# Patient Record
Sex: FEMALE | NOT HISPANIC OR LATINO | Employment: FULL TIME | URBAN - METROPOLITAN AREA
[De-identification: names, ages, dates, MRNs, and addresses within clinical notes are randomized per-mention and may not be internally consistent; named-entity substitution may affect disease eponyms.]

---

## 2022-04-26 ENCOUNTER — OFFICE VISIT (OUTPATIENT)
Dept: FAMILY MEDICINE CLINIC | Facility: CLINIC | Age: 42
End: 2022-04-26

## 2022-04-26 VITALS
DIASTOLIC BLOOD PRESSURE: 62 MMHG | BODY MASS INDEX: 21.14 KG/M2 | HEIGHT: 61 IN | HEART RATE: 88 BPM | SYSTOLIC BLOOD PRESSURE: 118 MMHG | TEMPERATURE: 97.7 F | OXYGEN SATURATION: 99 % | WEIGHT: 112 LBS

## 2022-04-26 DIAGNOSIS — Z01.419 WELL WOMAN EXAM WITH ROUTINE GYNECOLOGICAL EXAM: Primary | ICD-10-CM

## 2022-04-26 DIAGNOSIS — Z11.59 NEED FOR HEPATITIS C SCREENING TEST: ICD-10-CM

## 2022-04-26 DIAGNOSIS — Z11.3 SCREEN FOR STD (SEXUALLY TRANSMITTED DISEASE): ICD-10-CM

## 2022-04-26 DIAGNOSIS — Z00.00 WELLNESS EXAMINATION: ICD-10-CM

## 2022-04-26 PROBLEM — B97.7 HUMAN PAPILLOMA VIRUS INFECTION: Status: ACTIVE | Noted: 2020-03-06

## 2022-04-26 PROBLEM — G93.0 CHOROID PLEXUS CYST: Status: ACTIVE | Noted: 2020-05-01

## 2022-04-26 PROBLEM — B97.7 HUMAN PAPILLOMA VIRUS INFECTION: Status: RESOLVED | Noted: 2020-03-06 | Resolved: 2022-04-26

## 2022-04-26 PROBLEM — IMO0002 FETAL GROWTH RESTRICTION: Status: ACTIVE | Noted: 2020-09-14

## 2022-04-26 PROBLEM — IMO0002 FETAL GROWTH RESTRICTION: Status: RESOLVED | Noted: 2020-09-14 | Resolved: 2022-04-26

## 2022-04-26 LAB
ALBUMIN SERPL-MCNC: 4.5 G/DL (ref 3.5–5.2)
ALBUMIN/GLOB SERPL: 1.4 G/DL
ALP SERPL-CCNC: 49 U/L (ref 39–117)
ALT SERPL W P-5'-P-CCNC: 13 U/L (ref 1–33)
ANION GAP SERPL CALCULATED.3IONS-SCNC: 12.9 MMOL/L (ref 5–15)
AST SERPL-CCNC: 16 U/L (ref 1–32)
BASOPHILS # BLD AUTO: 0.05 10*3/MM3 (ref 0–0.2)
BASOPHILS NFR BLD AUTO: 1.1 % (ref 0–1.5)
BILIRUB BLD-MCNC: NEGATIVE MG/DL
BILIRUB SERPL-MCNC: 0.5 MG/DL (ref 0–1.2)
BUN SERPL-MCNC: 11 MG/DL (ref 6–20)
BUN/CREAT SERPL: 16.4 (ref 7–25)
CALCIUM SPEC-SCNC: 9.3 MG/DL (ref 8.6–10.5)
CANDIDA SPECIES: NEGATIVE
CHLORIDE SERPL-SCNC: 103 MMOL/L (ref 98–107)
CHOLEST SERPL-MCNC: 254 MG/DL (ref 0–200)
CLARITY, POC: CLEAR
CO2 SERPL-SCNC: 22.1 MMOL/L (ref 22–29)
COLOR UR: ABNORMAL
CREAT SERPL-MCNC: 0.67 MG/DL (ref 0.57–1)
DEPRECATED RDW RBC AUTO: 43.9 FL (ref 37–54)
EGFRCR SERPLBLD CKD-EPI 2021: 114.2 ML/MIN/1.73
EOSINOPHIL # BLD AUTO: 0.05 10*3/MM3 (ref 0–0.4)
EOSINOPHIL NFR BLD AUTO: 1.1 % (ref 0.3–6.2)
ERYTHROCYTE [DISTWIDTH] IN BLOOD BY AUTOMATED COUNT: 16.4 % (ref 12.3–15.4)
GARDNERELLA VAGINALIS: NEGATIVE
GLOBULIN UR ELPH-MCNC: 3.2 GM/DL
GLUCOSE SERPL-MCNC: 88 MG/DL (ref 65–99)
GLUCOSE UR STRIP-MCNC: NEGATIVE MG/DL
HAV IGM SERPL QL IA: NORMAL
HBV CORE IGM SERPL QL IA: NORMAL
HBV SURFACE AG SERPL QL IA: NORMAL
HCT VFR BLD AUTO: 37.7 % (ref 34–46.6)
HCV AB SER DONR QL: NORMAL
HDLC SERPL-MCNC: 88 MG/DL (ref 40–60)
HGB BLD-MCNC: 11.3 G/DL (ref 12–15.9)
HIV1+2 AB SER QL: NORMAL
IMM GRANULOCYTES # BLD AUTO: 0.01 10*3/MM3 (ref 0–0.05)
IMM GRANULOCYTES NFR BLD AUTO: 0.2 % (ref 0–0.5)
KETONES UR QL: ABNORMAL
LDLC SERPL CALC-MCNC: 150 MG/DL (ref 0–100)
LDLC/HDLC SERPL: 1.67 {RATIO}
LEUKOCYTE EST, POC: NEGATIVE
LYMPHOCYTES # BLD AUTO: 1.24 10*3/MM3 (ref 0.7–3.1)
LYMPHOCYTES NFR BLD AUTO: 27.6 % (ref 19.6–45.3)
MCH RBC QN AUTO: 22.8 PG (ref 26.6–33)
MCHC RBC AUTO-ENTMCNC: 30 G/DL (ref 31.5–35.7)
MCV RBC AUTO: 76 FL (ref 79–97)
MONOCYTES # BLD AUTO: 0.32 10*3/MM3 (ref 0.1–0.9)
MONOCYTES NFR BLD AUTO: 7.1 % (ref 5–12)
NEUTROPHILS NFR BLD AUTO: 2.83 10*3/MM3 (ref 1.7–7)
NEUTROPHILS NFR BLD AUTO: 62.9 % (ref 42.7–76)
NITRITE UR-MCNC: NEGATIVE MG/ML
NRBC BLD AUTO-RTO: 0 /100 WBC (ref 0–0.2)
PH UR: 5.5 [PH] (ref 5–8)
PLATELET # BLD AUTO: 313 10*3/MM3 (ref 140–450)
PMV BLD AUTO: 10.1 FL (ref 6–12)
POTASSIUM SERPL-SCNC: 4.2 MMOL/L (ref 3.5–5.2)
PROT SERPL-MCNC: 7.7 G/DL (ref 6–8.5)
PROT UR STRIP-MCNC: NEGATIVE MG/DL
RBC # BLD AUTO: 4.96 10*6/MM3 (ref 3.77–5.28)
RBC # UR STRIP: NEGATIVE /UL
SODIUM SERPL-SCNC: 138 MMOL/L (ref 136–145)
SP GR UR: 1.02 (ref 1–1.03)
T VAGINALIS DNA VAG QL PROBE+SIG AMP: NEGATIVE
TRIGL SERPL-MCNC: 95 MG/DL (ref 0–150)
TSH SERPL DL<=0.05 MIU/L-ACNC: 0.98 UIU/ML (ref 0.27–4.2)
UROBILINOGEN UR QL: NORMAL
VLDLC SERPL-MCNC: 16 MG/DL (ref 5–40)
WBC NRBC COR # BLD: 4.5 10*3/MM3 (ref 3.4–10.8)

## 2022-04-26 PROCEDURE — 85025 COMPLETE CBC W/AUTO DIFF WBC: CPT | Performed by: NURSE PRACTITIONER

## 2022-04-26 PROCEDURE — 80053 COMPREHEN METABOLIC PANEL: CPT | Performed by: NURSE PRACTITIONER

## 2022-04-26 PROCEDURE — 99385 PREV VISIT NEW AGE 18-39: CPT | Performed by: NURSE PRACTITIONER

## 2022-04-26 PROCEDURE — 36415 COLL VENOUS BLD VENIPUNCTURE: CPT | Performed by: NURSE PRACTITIONER

## 2022-04-26 PROCEDURE — G0432 EIA HIV-1/HIV-2 SCREEN: HCPCS | Performed by: NURSE PRACTITIONER

## 2022-04-26 PROCEDURE — 86695 HERPES SIMPLEX TYPE 1 TEST: CPT | Performed by: NURSE PRACTITIONER

## 2022-04-26 PROCEDURE — 86696 HERPES SIMPLEX TYPE 2 TEST: CPT | Performed by: NURSE PRACTITIONER

## 2022-04-26 PROCEDURE — 87661 TRICHOMONAS VAGINALIS AMPLIF: CPT | Performed by: NURSE PRACTITIONER

## 2022-04-26 PROCEDURE — 87591 N.GONORRHOEAE DNA AMP PROB: CPT | Performed by: NURSE PRACTITIONER

## 2022-04-26 PROCEDURE — 87660 TRICHOMONAS VAGIN DIR PROBE: CPT | Performed by: NURSE PRACTITIONER

## 2022-04-26 PROCEDURE — 87510 GARDNER VAG DNA DIR PROBE: CPT | Performed by: NURSE PRACTITIONER

## 2022-04-26 PROCEDURE — 80061 LIPID PANEL: CPT | Performed by: NURSE PRACTITIONER

## 2022-04-26 PROCEDURE — 84443 ASSAY THYROID STIM HORMONE: CPT | Performed by: NURSE PRACTITIONER

## 2022-04-26 PROCEDURE — 86592 SYPHILIS TEST NON-TREP QUAL: CPT | Performed by: NURSE PRACTITIONER

## 2022-04-26 PROCEDURE — 87480 CANDIDA DNA DIR PROBE: CPT | Performed by: NURSE PRACTITIONER

## 2022-04-26 PROCEDURE — 80074 ACUTE HEPATITIS PANEL: CPT | Performed by: NURSE PRACTITIONER

## 2022-04-26 PROCEDURE — 87491 CHLMYD TRACH DNA AMP PROBE: CPT | Performed by: NURSE PRACTITIONER

## 2022-04-26 PROCEDURE — 81002 URINALYSIS NONAUTO W/O SCOPE: CPT | Performed by: NURSE PRACTITIONER

## 2022-04-26 RX ORDER — PROGESTERONE 200 MG/1
CAPSULE ORAL
COMMUNITY
End: 2022-04-26

## 2022-04-26 RX ORDER — METRONIDAZOLE 500 MG/1
TABLET ORAL
COMMUNITY
End: 2022-04-26

## 2022-04-26 RX ORDER — IBUPROFEN 800 MG/1
TABLET ORAL
COMMUNITY
End: 2022-04-26

## 2022-04-26 NOTE — PROGRESS NOTES
Venipuncture Blood Specimen Collection  Venipuncture performed in LEFT ARM  by Marj Mckenzie with good hemostasis. Patient tolerated the procedure well without complications.   04/26/22   Marj Mckenzie

## 2022-04-26 NOTE — PROGRESS NOTES
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     Social History     Tobacco Use   • Smoking status: Former Smoker   • Smokeless tobacco: Never Used   Vaping Use   • Vaping Use: Every day   • Substances: Nicotine   • Devices: RefZenpriseble tank   Substance Use Topics   • Alcohol use: Not Currently   • Drug use: Defer      Current Outpatient Medications on File Prior to Visit   Medication Sig   • [DISCONTINUED] ibuprofen (ADVIL,MOTRIN) 800 MG tablet ibuprofen 800 mg tablet   • [DISCONTINUED] metroNIDAZOLE (FLAGYL) 500 MG tablet metronidazole 500 mg tablet   • [DISCONTINUED] Progesterone (PROMETRIUM) 200 MG capsule progesterone micronized 200 mg capsule   place one capsule in vagina each night before bedtime     No current facility-administered medications on file prior to visit.      No Known Allergies   Health Maintenance Due   Topic Date Due   • ANNUAL PHYSICAL  Never done   • COVID-19 Vaccine (1) Never done   • HEPATITIS C SCREENING  Never done   • PAP SMEAR  Never done      Eugene Boles presents to [facility]  Here as a new patient for an annual wellness with pap smear.     Last pap smear in Nov of 2020. She has 5 living children, last 3 pregnancy were premature deliveries (36 weeks, 35 weeks, 34 weeks, 33 weeks, 34 weeks). She was on progesterone for her last pregnancy due to low levels.  Patient states she has a history of an abnormal Pap about 10 years ago where she had a biopsy that was benign.    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    Patient denies breast lumps tenderness or swelling.  Discussed with patient that she will be due for mammogram upon return from deployment.      Objective   Vital Signs:   /62 (BP Location: Left arm, Patient Position: Sitting, Cuff Size:  "Adult)   Pulse 88   Temp 97.7 °F (36.5 °C) (Temporal)   Ht 153.7 cm (60.5\")   Wt 50.8 kg (112 lb)   SpO2 99%   BMI 21.51 kg/m²     Review of Systems   Physical Exam  Vitals reviewed.   Constitutional:       General: She is not in acute distress.     Appearance: Normal appearance. She is well-developed.   HENT:      Head: Normocephalic and atraumatic.      Right Ear: External ear normal.      Left Ear: External ear normal.   Eyes:      Conjunctiva/sclera: Conjunctivae normal.      Pupils: Pupils are equal, round, and reactive to light.   Cardiovascular:      Rate and Rhythm: Normal rate and regular rhythm.      Heart sounds: Normal heart sounds.   Pulmonary:      Effort: Pulmonary effort is normal.      Breath sounds: Normal breath sounds.   Genitourinary:     General: Normal vulva.      Vagina: Vaginal discharge present.      Uterus: Not tender.       Adnexa:         Right: No tenderness.          Left: No tenderness.              Comments: Light brown discharge noted in vaginal vault.  Musculoskeletal:      Cervical back: Neck supple.      Right lower leg: No edema.      Left lower leg: No edema.   Skin:     General: Skin is warm and dry.   Neurological:      Mental Status: She is alert and oriented to person, place, and time.      Cranial Nerves: No cranial nerve deficit.   Psychiatric:         Mood and Affect: Mood and affect normal.         Behavior: Behavior normal.         Thought Content: Thought content normal.         Judgment: Judgment normal.        Result Review :                 Assessment and Plan    Diagnoses and all orders for this visit:    1. Well woman exam with routine gynecological exam (Primary)  -     IgP, Aptima HPV    2. Wellness examination  -     POCT urinalysis dipstick, manual  -     CBC & Differential  -     Comprehensive Metabolic Panel  -     Lipid Panel  -     TSH Rfx On Abnormal To Free T4    3. Need for hepatitis C screening test    4. Screen for STD (sexually transmitted " disease)  -     RPR  -     Hepatitis Panel, Acute  -     HIV-1 / O / 2 Ag / Antibody 4th Generation  -     HSV 1 & 2 - Specific Antibody, IgG  -     Chlamydia trachomatis, Neisseria gonorrhoeae, Trichomonas vaginalis, PCR - Swab, Vagina  -     Gardnerella vaginalis, Trichomonas vaginalis, Candida albicans, DNA - Swab, Vagina        Follow Up   Return in about 1 year (around 4/26/2023) for Annual physical.  Patient was given instructions and counseling regarding her condition or for health maintenance advice. Please see specific information pulled into the AVS if appropriate.

## 2022-04-27 LAB
HSV1 IGG SER IA-ACNC: 28.9 INDEX (ref 0–0.9)
HSV2 IGG SER IA-ACNC: <0.91 INDEX (ref 0–0.9)
RPR SER QL: NORMAL

## 2022-04-28 LAB
C TRACH RRNA SPEC QL NAA+PROBE: NEGATIVE
N GONORRHOEA RRNA SPEC QL NAA+PROBE: NEGATIVE
T VAGINALIS RRNA SPEC QL NAA+PROBE: NEGATIVE

## 2024-01-24 ENCOUNTER — OFFICE VISIT (OUTPATIENT)
Dept: INTERNAL MEDICINE | Facility: CLINIC | Age: 44
End: 2024-01-24
Payer: COMMERCIAL

## 2024-01-24 VITALS
HEIGHT: 61 IN | TEMPERATURE: 97 F | HEART RATE: 73 BPM | WEIGHT: 119 LBS | DIASTOLIC BLOOD PRESSURE: 84 MMHG | OXYGEN SATURATION: 99 % | BODY MASS INDEX: 22.47 KG/M2 | SYSTOLIC BLOOD PRESSURE: 130 MMHG

## 2024-01-24 DIAGNOSIS — R09.81 CHRONIC NASAL CONGESTION: ICD-10-CM

## 2024-01-24 DIAGNOSIS — Z11.3 SCREEN FOR STD (SEXUALLY TRANSMITTED DISEASE): ICD-10-CM

## 2024-01-24 DIAGNOSIS — Z13.29 THYROID DISORDER SCREEN: ICD-10-CM

## 2024-01-24 DIAGNOSIS — Z30.8 ENCOUNTER FOR TUBAL LIGATION COUNSELING: ICD-10-CM

## 2024-01-24 DIAGNOSIS — Z76.89 ESTABLISHING CARE WITH NEW DOCTOR, ENCOUNTER FOR: Primary | ICD-10-CM

## 2024-01-24 DIAGNOSIS — Z00.00 ANNUAL PHYSICAL EXAM: ICD-10-CM

## 2024-01-24 DIAGNOSIS — Z13.220 LIPID SCREENING: ICD-10-CM

## 2024-01-24 DIAGNOSIS — H61.23 BILATERAL IMPACTED CERUMEN: ICD-10-CM

## 2024-01-24 NOTE — PROGRESS NOTES
"Chief Complaint  Establish Care and Annual Exam    Subjective        Eugene Boles presents to [facility] for establishment of care, annual checkup, acute concerns.    Patient reports overall she is in a good state of health, she does not currently take any medications.  She has no her medical conditions she is aware of.  No known allergies.  She had a visit a couple of years ago, had a well woman and annual physical exam, unfortunately her Pap smear results never got completed, so unknown.  She did have STD screening done, which was negative, lab work was completed, which did show elevated cholesterol, unclear if she was fasting or not.  She does have acute concern regarding chronic congestion, feels difficult to breathe through both nasal passages.  She does not have runny nose, itchy eyes, watery eyes, sneezing.  She is also concerned regarding her menstrual cycles, she had some difficulties over the last year, they did do a D&C in were considering hysterectomy, but [place] was [place] at the time, so they deferred.  She was on oral hormonal contraceptives until August, and her cycles have returned to normal.  She is a mother of 5, and she is desiring tubal for permanent birth control measures.  She would like a referral to gynecology to have that completed.  She would like to have additional STD screening completed.    Objective   Vital Signs:   /84 (BP Location: Left arm, Patient Position: Sitting, Cuff Size: Adult)   Pulse 73   Temp 97 °F (36.1 °C) (Temporal)   Ht 153.7 cm (60.51\")   Wt 54 kg (119 lb)   SpO2 99%   BMI 22.85 kg/m²     Physical Exam  Vitals and nursing note reviewed.   Constitutional:       Appearance: Normal appearance. She is normal weight.   HENT:      Head: Normocephalic and atraumatic.      Right Ear: External ear normal. There is impacted cerumen.      Left Ear: External ear normal. There is impacted cerumen.      Nose: Congestion present.      " Mouth/Throat:      Mouth: Mucous membranes are moist.      Pharynx: Oropharynx is clear.   Eyes:      Conjunctiva/sclera: Conjunctivae normal.      Pupils: Pupils are equal, round, and reactive to light.   Cardiovascular:      Rate and Rhythm: Normal rate and regular rhythm.   Pulmonary:      Effort: Pulmonary effort is normal.      Breath sounds: Normal breath sounds.   Neurological:      Mental Status: She is alert.   Psychiatric:         Mood and Affect: Mood normal.         Thought Content: Thought content normal.        Result Review :  {The following data was reviewed by PER Bolden on 01/24/24       Ear Cerumen Removal    Date/Time: 1/24/2024 4:32 PM    Performed by: Laz Peter APRN  Authorized by: Laz Peter APRN    Anesthesia:  Local Anesthetic: none  Location details: left ear and right ear  Patient tolerance: patient tolerated the procedure well with no immediate complications  Comments: Copious amounts of cerumen removed bilaterally.  Patient tolerated well.  Improved hearing post procedure.  Procedure type: instrumentation, irrigation   Sedation:  Patient sedated: no                Diagnoses and all orders for this visit:    1. Establishing care with new doctor, encounter for (Primary)  -     HIV-1/O/2 ANTIGEN/ANTIBODY, 4TH GENERATION  -     HSV 1 and 2-Specific Ab, IgG  -     RPR  -     Chlamydia trachomatis, Neisseria gonorrhoeae, PCR - , Urine, Random Void    2. Annual physical exam  Assessment & Plan:  Screening labs reviewed/ordered  Counseling provided regarding age appropriate screenings and immunizations, healthy diet and exercise.       Orders:  -     Comprehensive Metabolic Panel  -     CBC & Differential  -     TSH  -     Lipid Panel  -     Fluzone >6 Months ([id number])    3. Lipid screening  -     Lipid Panel    4. Thyroid disorder screen  -     TSH    5. Screen for STD (sexually transmitted disease)    6. Chronic nasal congestion  -     Ambulatory Referral to ENT  (Otolaryngology)    7. Encounter for tubal ligation counseling  -     Ambulatory Referral to Obstetrics / Gynecology    8. Bilateral impacted cerumen    Other orders  -     Ear Cerumen Removal    Patient will schedule a follow-up for nurse visit for lab work, not fasting today.  We will follow-up with her with those results when they are available.  Cerumen disimpacted today, hearing improved.  Referral to ENT for chronic congestion.  Referral to gynecology for discussion regarding tubal and need for any other types of contraceptives.  Would recommend follow-up here annually, sooner if needed.      Follow Up   No follow-ups on file.  Patient was given instructions and counseling regarding her condition or for health maintenance advice. Please see specific information pulled into the AVS if appropriate.     Laz Peter, APRN  1/24/2024  This note was electronically signed.

## 2024-01-25 ENCOUNTER — CLINICAL SUPPORT (OUTPATIENT)
Dept: INTERNAL MEDICINE | Facility: CLINIC | Age: 44
End: 2024-01-25
Payer: COMMERCIAL

## 2024-01-25 ENCOUNTER — TELEPHONE (OUTPATIENT)
Dept: OBSTETRICS AND GYNECOLOGY | Facility: CLINIC | Age: 44
End: 2024-01-25
Payer: COMMERCIAL

## 2024-01-25 DIAGNOSIS — Z01.89 ENCOUNTER FOR LABORATORY TEST: Primary | ICD-10-CM

## 2024-01-25 LAB
ALBUMIN SERPL-MCNC: 4.5 G/DL (ref 3.5–5.2)
ALBUMIN/GLOB SERPL: 1.5 G/DL
ALP SERPL-CCNC: 98 U/L (ref 39–117)
ALT SERPL W P-5'-P-CCNC: 164 U/L (ref 1–33)
ANION GAP SERPL CALCULATED.3IONS-SCNC: 11.8 MMOL/L (ref 5–15)
AST SERPL-CCNC: 73 U/L (ref 1–32)
BASOPHILS # BLD AUTO: 0.02 10*3/MM3 (ref 0–0.2)
BASOPHILS NFR BLD AUTO: 0.6 % (ref 0–1.5)
BILIRUB SERPL-MCNC: 0.8 MG/DL (ref 0–1.2)
BUN SERPL-MCNC: 10 MG/DL (ref 6–20)
BUN/CREAT SERPL: 13 (ref 7–25)
C TRACH RRNA CVX QL NAA+PROBE: NOT DETECTED
CALCIUM SPEC-SCNC: 9.4 MG/DL (ref 8.6–10.5)
CHLORIDE SERPL-SCNC: 104 MMOL/L (ref 98–107)
CHOLEST SERPL-MCNC: 222 MG/DL (ref 0–200)
CO2 SERPL-SCNC: 25.2 MMOL/L (ref 22–29)
CREAT SERPL-MCNC: 0.77 MG/DL (ref 0.57–1)
DEPRECATED RDW RBC AUTO: 38.4 FL (ref 37–54)
EGFRCR SERPLBLD CKD-EPI 2021: 99.5 ML/MIN/1.73
EOSINOPHIL # BLD AUTO: 0.05 10*3/MM3 (ref 0–0.4)
EOSINOPHIL NFR BLD AUTO: 1.4 % (ref 0.3–6.2)
ERYTHROCYTE [DISTWIDTH] IN BLOOD BY AUTOMATED COUNT: 11.4 % (ref 12.3–15.4)
GLOBULIN UR ELPH-MCNC: 3 GM/DL
GLUCOSE SERPL-MCNC: 90 MG/DL (ref 65–99)
HCT VFR BLD AUTO: 43.9 % (ref 34–46.6)
HDLC SERPL-MCNC: 75 MG/DL (ref 40–60)
HGB BLD-MCNC: 14.5 G/DL (ref 12–15.9)
HIV 1+2 AB+HIV1 P24 AG SERPL QL IA: NORMAL
IMM GRANULOCYTES # BLD AUTO: 0 10*3/MM3 (ref 0–0.05)
IMM GRANULOCYTES NFR BLD AUTO: 0 % (ref 0–0.5)
LDLC SERPL CALC-MCNC: 104 MG/DL (ref 0–100)
LDLC/HDLC SERPL: 1.28 {RATIO}
LYMPHOCYTES # BLD AUTO: 1.18 10*3/MM3 (ref 0.7–3.1)
LYMPHOCYTES NFR BLD AUTO: 32.6 % (ref 19.6–45.3)
MCH RBC QN AUTO: 30.5 PG (ref 26.6–33)
MCHC RBC AUTO-ENTMCNC: 33 G/DL (ref 31.5–35.7)
MCV RBC AUTO: 92.4 FL (ref 79–97)
MONOCYTES # BLD AUTO: 0.28 10*3/MM3 (ref 0.1–0.9)
MONOCYTES NFR BLD AUTO: 7.7 % (ref 5–12)
N GONORRHOEA RRNA SPEC QL NAA+PROBE: NOT DETECTED
NEUTROPHILS NFR BLD AUTO: 2.09 10*3/MM3 (ref 1.7–7)
NEUTROPHILS NFR BLD AUTO: 57.7 % (ref 42.7–76)
NRBC BLD AUTO-RTO: 0 /100 WBC (ref 0–0.2)
PLATELET # BLD AUTO: 209 10*3/MM3 (ref 140–450)
PMV BLD AUTO: 10.3 FL (ref 6–12)
POTASSIUM SERPL-SCNC: 3.8 MMOL/L (ref 3.5–5.2)
PROT SERPL-MCNC: 7.5 G/DL (ref 6–8.5)
RBC # BLD AUTO: 4.75 10*6/MM3 (ref 3.77–5.28)
RPR SER QL: NORMAL
SODIUM SERPL-SCNC: 141 MMOL/L (ref 136–145)
TRIGL SERPL-MCNC: 254 MG/DL (ref 0–150)
TSH SERPL DL<=0.05 MIU/L-ACNC: 0.93 UIU/ML (ref 0.27–4.2)
VLDLC SERPL-MCNC: 43 MG/DL (ref 5–40)
WBC NRBC COR # BLD AUTO: 3.62 10*3/MM3 (ref 3.4–10.8)

## 2024-01-25 PROCEDURE — 86696 HERPES SIMPLEX TYPE 2 TEST: CPT | Performed by: NURSE PRACTITIONER

## 2024-01-25 PROCEDURE — 85025 COMPLETE CBC W/AUTO DIFF WBC: CPT | Performed by: NURSE PRACTITIONER

## 2024-01-25 PROCEDURE — 80061 LIPID PANEL: CPT | Performed by: NURSE PRACTITIONER

## 2024-01-25 PROCEDURE — 86695 HERPES SIMPLEX TYPE 1 TEST: CPT | Performed by: NURSE PRACTITIONER

## 2024-01-25 PROCEDURE — 36415 COLL VENOUS BLD VENIPUNCTURE: CPT | Performed by: NURSE PRACTITIONER

## 2024-01-25 PROCEDURE — 84443 ASSAY THYROID STIM HORMONE: CPT | Performed by: NURSE PRACTITIONER

## 2024-01-25 PROCEDURE — 87591 N.GONORRHOEAE DNA AMP PROB: CPT | Performed by: NURSE PRACTITIONER

## 2024-01-25 PROCEDURE — 80053 COMPREHEN METABOLIC PANEL: CPT | Performed by: NURSE PRACTITIONER

## 2024-01-25 PROCEDURE — G0432 EIA HIV-1/HIV-2 SCREEN: HCPCS | Performed by: NURSE PRACTITIONER

## 2024-01-25 PROCEDURE — 86592 SYPHILIS TEST NON-TREP QUAL: CPT | Performed by: NURSE PRACTITIONER

## 2024-01-25 PROCEDURE — 87491 CHLMYD TRACH DNA AMP PROBE: CPT | Performed by: NURSE PRACTITIONER

## 2024-01-25 NOTE — PROGRESS NOTES
Venipuncture Blood Specimen Collection  Venipuncture performed in [facility] by Thea Ritter RN with good hemostasis. Patient tolerated the procedure well without complications. Urine, random void left for testing as well.  01/25/24   Thea Ritter RN

## 2024-01-26 LAB
HSV1 IGG SER IA-ACNC: 23.4 INDEX (ref 0–0.9)
HSV2 IGG SER IA-ACNC: <0.91 INDEX (ref 0–0.9)

## 2024-01-31 ENCOUNTER — OFFICE VISIT (OUTPATIENT)
Dept: OBSTETRICS AND GYNECOLOGY | Facility: CLINIC | Age: 44
End: 2024-01-31
Payer: COMMERCIAL

## 2024-01-31 VITALS
HEIGHT: 61 IN | BODY MASS INDEX: 22.09 KG/M2 | HEART RATE: 83 BPM | WEIGHT: 117 LBS | SYSTOLIC BLOOD PRESSURE: 132 MMHG | DIASTOLIC BLOOD PRESSURE: 79 MMHG

## 2024-01-31 DIAGNOSIS — Z01.419 WOMEN'S ANNUAL ROUTINE GYNECOLOGICAL EXAMINATION: Primary | ICD-10-CM

## 2024-01-31 DIAGNOSIS — Z30.09 UNWANTED FERTILITY: ICD-10-CM

## 2024-01-31 DIAGNOSIS — N89.8 VAGINAL DISCHARGE: ICD-10-CM

## 2024-01-31 PROBLEM — Z98.891 HISTORY OF CESAREAN SECTION: Status: RESOLVED | Noted: 2024-01-31 | Resolved: 2024-01-31

## 2024-01-31 PROBLEM — H61.23 BILATERAL IMPACTED CERUMEN: Status: RESOLVED | Noted: 2024-01-24 | Resolved: 2024-01-31

## 2024-01-31 PROBLEM — Z00.00 ANNUAL PHYSICAL EXAM: Status: RESOLVED | Noted: 2024-01-24 | Resolved: 2024-01-31

## 2024-01-31 LAB
CANDIDA SPECIES: NEGATIVE
GARDNERELLA VAGINALIS: NEGATIVE
T VAGINALIS DNA VAG QL PROBE+SIG AMP: NEGATIVE

## 2024-01-31 PROCEDURE — 87480 CANDIDA DNA DIR PROBE: CPT | Performed by: OBSTETRICS & GYNECOLOGY

## 2024-01-31 PROCEDURE — 87510 GARDNER VAG DNA DIR PROBE: CPT | Performed by: OBSTETRICS & GYNECOLOGY

## 2024-01-31 PROCEDURE — 99386 PREV VISIT NEW AGE 40-64: CPT | Performed by: OBSTETRICS & GYNECOLOGY

## 2024-01-31 PROCEDURE — 87660 TRICHOMONAS VAGIN DIR PROBE: CPT | Performed by: OBSTETRICS & GYNECOLOGY

## 2024-01-31 RX ORDER — SODIUM CHLORIDE 0.9 % (FLUSH) 0.9 %
3 SYRINGE (ML) INJECTION EVERY 12 HOURS SCHEDULED
OUTPATIENT
Start: 2024-01-31

## 2024-01-31 RX ORDER — SODIUM CHLORIDE 9 MG/ML
40 INJECTION, SOLUTION INTRAVENOUS AS NEEDED
OUTPATIENT
Start: 2024-01-31

## 2024-01-31 RX ORDER — SODIUM CHLORIDE, SODIUM LACTATE, POTASSIUM CHLORIDE, CALCIUM CHLORIDE 600; 310; 30; 20 MG/100ML; MG/100ML; MG/100ML; MG/100ML
125 INJECTION, SOLUTION INTRAVENOUS CONTINUOUS
OUTPATIENT
Start: 2024-01-31

## 2024-01-31 RX ORDER — ONDANSETRON 2 MG/ML
4 INJECTION INTRAMUSCULAR; INTRAVENOUS EVERY 6 HOURS PRN
OUTPATIENT
Start: 2024-01-31

## 2024-01-31 RX ORDER — SODIUM CHLORIDE 0.9 % (FLUSH) 0.9 %
10 SYRINGE (ML) INJECTION AS NEEDED
OUTPATIENT
Start: 2024-01-31

## 2024-01-31 NOTE — PROGRESS NOTES
"Well Woman Visit    CC: BOB     HPI:   43 y.o. who presents for a well woman exam.  Her menstrual cycles are monthly, lasting approximately 5 days.  She does report some increased vaginal discharge.  She also is interested in proceeding with permanent sterilization.  She states she has no desire for any future childbearing.  She is not interested in other contraceptive methods.    History: PMHx, Meds, Allergies, PSHx, Social Hx, and POBHx all reviewed and updated.    /79   Pulse 83   Ht 153.7 cm (60.51\")   Wt 53.1 kg (117 lb)   LMP 2024   Breastfeeding No   BMI 22.47 kg/m²     Physical Exam  Vitals and nursing note reviewed. Exam conducted with a chaperone present.   Constitutional:       General: She is not in acute distress.     Appearance: Normal appearance. She is not ill-appearing.   HENT:      Head: Normocephalic and atraumatic.      Mouth/Throat:      Mouth: Mucous membranes are moist.      Pharynx: Oropharynx is clear.   Eyes:      Extraocular Movements: Extraocular movements intact.   Neck:      Thyroid: No thyroid mass or thyromegaly.   Chest:   Breasts:     Breasts are symmetrical.      Right: Normal. No swelling, bleeding, inverted nipple, mass, nipple discharge, skin change or tenderness.      Left: Normal. No swelling, bleeding, inverted nipple, mass, nipple discharge, skin change or tenderness.   Abdominal:      General: There is no distension.      Palpations: Abdomen is soft. There is no mass.      Tenderness: There is no abdominal tenderness.      Hernia: There is no hernia in the left inguinal area or right inguinal area.   Genitourinary:     General: Normal vulva.      Exam position: Lithotomy position.      Pubic Area: No rash.       Labia:         Right: No rash, tenderness, lesion or injury.         Left: No rash, tenderness, lesion or injury.       Urethra: No prolapse, urethral pain or urethral lesion.      Vagina: Normal. No vaginal discharge, erythema, tenderness, " bleeding or prolapsed vaginal walls.      Cervix: Normal. No cervical motion tenderness, friability, lesion, erythema or cervical bleeding.      Uterus: Normal. Not enlarged and not tender.       Adnexa:         Right: No mass or tenderness.          Left: No mass or tenderness.     Lymphadenopathy:      Upper Body:      Right upper body: No supraclavicular or axillary adenopathy.      Left upper body: No supraclavicular or axillary adenopathy.   Skin:     General: Skin is warm and dry.   Neurological:      Mental Status: She is alert and oriented to person, place, and time.   Psychiatric:         Mood and Affect: Mood normal.         Behavior: Behavior normal.         Thought Content: Thought content normal.         ASSESSMENT AND PLAN:    Diagnoses and all orders for this visit:    1. Women's annual routine gynecological examination (Primary)  Assessment & Plan:  Pap  Mammogram  Recommend daily multivitamin with folic acid    Orders:  -     IGP,rfx Aptima HPV All Pth  -     Mammo Screening Digital Tomosynthesis Bilateral With CAD; Future    2. Vaginal discharge  -     Gardnerella vaginalis, Trichomonas vaginalis, Candida albicans, DNA - Swab, Cervix    3. Unwanted fertility  Assessment & Plan:  We have discussed the risks, benefits, and alternatives to the procedure.  We discussed the risks including the risk of infection, bleeding and hemorrhage, injury to nearby structure including, bowel, bladder, pelvic vasculature, pelvic nerves, ovaries, and the uterus, and other nearby structures.  We have discussed the risks of regret, risk of failure, and if failure occurred and increased risk for ectopic pregnancy.  We discussed the risk of menstrual changes, hormonal changes, and risk of pelvic pain post sterilization.  The patient has been offered alternative forms of birth control including: Oral contraceptives, NuvaRing, birth control patches, progesterone only birth control pills, Depo-Provera, all intrauterine  contraceptives, partner vasectomy.  The patient expresses her understanding and wished to proceed with female permanent sterilization.  We have discussed the different methods of female sterilization including hysteroscopic and laparoscopic techniques.  With the laparoscopic techniques we have discussed occlusion of the tube with Filshie clips, Hulka clips, and Falope-Rings.  We have discussed cauterization of the fallopian tube, partial salpingectomy, and complete salpingectomy.  Patient desires complete salpingectomy.  She understands this completely irreversible.  The patient declines other forms of contraception until her surgery.  I have advised that she should remain abstinent or use condoms for contraception until surgery time.  She should absolutely be abstinent for at least 2 weeks prior to surgery.    Orders:  -     Case Request; Standing  -     sodium chloride 0.9 % flush 3 mL  -     sodium chloride 0.9 % flush 10 mL  -     sodium chloride 0.9 % infusion 40 mL  -     lactated ringers infusion  -     ondansetron (ZOFRAN) injection 4 mg  -     ceFAZolin (ANCEF) 2,000 mg in sodium chloride 0.9 % 100 mL IVPB  -     Case Request    Other orders  -     Follow Anesthesia Guidelines / Protocol; Future  -     Follow Anesthesia Guidelines / Protocol; Standing  -     Verify / Perform Chlorhexidine Skin Prep; Standing  -     Verify / Perform Chlorhexidine Skin Prep if Indicated (If Not Already Completed); Standing  -     Obtain Informed Consent; Future  -     Provide NPO Instructions to Patient; Future  -     Chlorhexidine Skin Prep; Future  -     Notify Physician - Standard; Standing  -     Type & Screen; Standing  -     Insert Peripheral IV; Standing  -     Saline Lock & Maintain IV Access; Standing  -     CBC & Differential; Standing  -     hCG, Quantitative, Pregnancy; Standing        Counseling:     All BC Options R/B/A/SE/E of each reviewed  Track menses, RTO IF <q21d, >7d long, or  heavy    Preventative:   MMG  s/p FLU vaccine this season  s/p COVID vaccine  COVID booster recommended    She understands the importance of having any ordered tests to be performed in a timely fashion.  The risks of not performing them include, but are not limited to, advanced cancer stages, bone loss from osteoporosis and/or subsequent increase in morbidity and/or mortality.  She is encouraged to review her results online and/or contact or office if she has questions.     Follow Up:  Return for After surgery.    Óscar Saha MD  01/31/2024

## 2024-02-01 ENCOUNTER — PATIENT ROUNDING (BHMG ONLY) (OUTPATIENT)
Dept: OBSTETRICS AND GYNECOLOGY | Facility: CLINIC | Age: 44
End: 2024-02-01
Payer: COMMERCIAL

## 2024-02-01 PROBLEM — G93.0 CHOROID PLEXUS CYST: Status: RESOLVED | Noted: 2020-05-01 | Resolved: 2024-02-01

## 2024-02-01 NOTE — ASSESSMENT & PLAN NOTE
We have discussed the risks, benefits, and alternatives to the procedure.  We discussed the risks including the risk of infection, bleeding and hemorrhage, injury to nearby structure including, bowel, bladder, pelvic vasculature, pelvic nerves, ovaries, and the uterus, and other nearby structures.  We have discussed the risks of regret, risk of failure, and if failure occurred and increased risk for ectopic pregnancy.  We discussed the risk of menstrual changes, hormonal changes, and risk of pelvic pain post sterilization.  The patient has been offered alternative forms of birth control including: Oral contraceptives, NuvaRing, birth control patches, progesterone only birth control pills, Depo-Provera, all intrauterine contraceptives, partner vasectomy.  The patient expresses her understanding and wished to proceed with female permanent sterilization.  We have discussed the different methods of female sterilization including hysteroscopic and laparoscopic techniques.  With the laparoscopic techniques we have discussed occlusion of the tube with Filshie clips, Hulka clips, and Falope-Rings.  We have discussed cauterization of the fallopian tube, partial salpingectomy, and complete salpingectomy.  Patient desires complete salpingectomy.  She understands this completely irreversible.  The patient declines other forms of contraception until her surgery.  I have advised that she should remain abstinent or use condoms for contraception until surgery time.  She should absolutely be abstinent for at least 2 weeks prior to surgery.

## 2024-02-04 LAB
CONV .: NORMAL
CYTOLOGIST CVX/VAG CYTO: NORMAL
CYTOLOGY CVX/VAG DOC CYTO: NORMAL
CYTOLOGY CVX/VAG DOC THIN PREP: NORMAL
DX ICD CODE: NORMAL
HIV 1 & 2 AB SER-IMP: NORMAL
OTHER STN SPEC: NORMAL
STAT OF ADQ CVX/VAG CYTO-IMP: NORMAL

## 2024-03-19 NOTE — PRE-PROCEDURE INSTRUCTIONS
IMPORTANT INSTRUCTIONS - PRE-ADMISSION TESTING  DO NOT EAT OR CHEW anything after midnight the night before your procedure.    You may have CLEAR liquids up to 2 hours prior to ARRIVAL time.   Take the following medications the morning of your procedure with JUST A SIP OF WATER: NONE    DO NOT BRING your medications to the hospital with you, UNLESS something has changed since your PRE-Admission Testing appointment.  Hold all vitamins, supplements, and NSAIDS (Non- steroidal anti-inflammatory meds) for one week prior to surgery (you MAY take Tylenol or Acetaminophen).  If you are diabetic, check your blood sugar the morning of your procedure. If it is less than 70 or if you are feeling symptomatic, call the following number for further instructions: [phone] SAME DAY SURGERY_.  Use your inhalers/nebulizers as usual, the morning of your procedure. BRING YOUR INHALERS with you.   Bring your CPAP or BIPAP to hospital, ONLY IF YOU WILL BE SPENDING THE NIGHT.   Make sure you have a ride home and have someone who will stay with you the day of your procedure after you go home.  If you have any questions, please call your Pre-Admission Testing NurseCANDIE at [phone]_.   Per anesthesia request, do not smoke for 24 hours before your procedure or as instructed by your surgeon.        Clear Liquid Diet        Find out when you need to start a clear liquid diet.   Think of “clear liquids” as anything you could read a newspaper through. This includes things like water, broth, sports drinks, or tea WITHOUT any kind of milk or cream.           Once you are told to start a clear liquid diet, only drink these things until 2 hours before arrival to the hospital or when the hospital says to stop. Total volume limitation: 8 oz.       Clear liquids you CAN drink:   Water   Clear broth: beef, chicken, vegetable, or bone broth with nothing in it   Gatorade   Lemonade or Nestor-aid   Soda   Tea, coffee (NO cream or honey)   Eliel-O  (without fruit)   Popsicles (without fruit or cream)   Italian ices   Juice without pulp: apple, white, grape   You may use salt, pepper, and sugar    Do NOT drink:   Milk or cream   Soy milk, almond milk, coconut milk, or other non-dairy drinks and   creamers   Milkshakes or smoothies   Tomato juice   Orange juice   Grapefruit juice   Cream soups or any other than broth         Clear Liquid Diet:  Do NOT eat any solid food.  Do NOT eat or suck on mints or candy.  Do NOT chew gum.  Do NOT drink thick liquids like milk or juice with pulp in it.  Do NOT add milk, cream, or anything like soy milk or almond milk to coffee or tea.   PREOPERATIVE (BEFORE SURGERY)              BATHING INSTRUCTIONS  Instructions:    You will need to shower 1 TIME   Wash your hair and face with normal shampoo and soap, rinse it well before using the surgical soap.      In the shower, wet the skin completely with water from your neck to your feet. Apply the cleanser to your   body ONLY FROM THE NECK TO YOUR FEET.     Do NOT USE THE CLEANSER ON YOUR FACE, HEAD, OR GENITAL (PRIVATE) AREAS.   Keep it out of your eyes, ears, and mouth because of the risk of injury to those areas.      Scrub with a clean washcloth for each bath utilizing the soap provided from the top of your body to the   bottom starting at the neck area.      Pay close attention to your armpits, groin area, and the site of surgery.      Wash your body gently for 5 minutes. Stand outside the stream or turn off the water while scrubbing your   body. Do NOT wash with your regular soap after the surgical cleanser is used.      RINSE THE CLEANSER OFF COMPLETELY with plenty of water. Rinse the area again thoroughly.      Dry off with a clean towel. The surgical soap can cause dryness; however do NOT APPLY LOTION,   CREAM, POWDER, and/or DEODORANT AFTER SHOWERING.     Be sure to where clean clothes after showering.      Ensure CLEAN BED LINENS AFTER FIRST wash with the surgical soap.       NO PETS ALLOWED IN THE BED with you after utilizing the surgical soap.

## 2024-03-20 PROCEDURE — S0260 H&P FOR SURGERY: HCPCS | Performed by: OBSTETRICS & GYNECOLOGY

## 2024-03-20 NOTE — H&P
[facility]   HISTORY AND PHYSICAL    Patient Name: Eugene Boles  : 1980  MRN: [id number]  Primary Care Physician:  Laz Peter APRN  Date of admission: 3/21/2024    Subjective   Subjective     Chief Complaint: Here for surgery    HPI:    Eugene oBles is a 43 y.o. female who presents for permanent surgical sterilization.  Patient expressed her desire for permanent sterilization at her last well woman exam.  She reports that she has no desire for any childbearing and wishes to avoid taking further medications.  We discussed options for contraception including all medical therapies including long-acting reversible contraceptives.  We discussed the option of partner vasectomy as well as permanent female sterilization.  The patient wishes to proceed with laparoscopic bilateral salpingectomy    Review of Systems   The following systems were reviewed and negative;  constitution, eyes, ENT, respiratory, cardiovascular, gastrointestinal, genitourinary, integument, breast, hematologic / lymphatic, musculoskeletal, neurological, behavioral/psych, endocrine, and allergies / immunologic.    Personal History     Past Medical History:   Diagnosis Date    Abnormal Pap smear of cervix     Fetal growth restriction 2020    Herpes     Cold sores    History of  section 2024    Human papilloma virus infection 2020    Migraine     Unwanted fertility        Past Surgical History:   Procedure Laterality Date    BREAST AUGMENTATION Bilateral     BREAST SURGERY  2011     SECTION N/A        Family History: family history includes COPD in her father; Colon cancer (age of onset: 55) in her maternal grandfather. Otherwise pertinent FHx was reviewed and not pertinent to current issue.    Social History:  reports that she quit smoking about 4 years ago. Her smoking use included cigarettes. She started smoking about 24 years ago. She has a 10 pack-year smoking history. She has never  been exposed to tobacco smoke. She has never used smokeless tobacco. She reports that she does not currently use alcohol. She reports that she does not use drugs.    Home Medications:         Allergies:  No Known Allergies    Objective   Objective     Vitals:   Temp:  [98.3 °F (36.8 °C)] 98.3 °F (36.8 °C)  Heart Rate:  [70] 70  Resp:  [18] 18  BP: (132)/(84) 132/84  Physical Exam    Constitutional: Awake, alert   Eyes: PERRLA, sclerae anicteric, no conjunctival injection   HENT: NCAT, mucous membranes moist   Neck: Supple, no thyromegaly, no lymphadenopathy, trachea midline   Respiratory: Clear to auscultation bilaterally, nonlabored respirations    Cardiovascular: RRR, no murmurs, rubs, or gallops, palpable pedal pulses bilaterally   Gastrointestinal: Positive bowel sounds, soft, nontender, nondistended              Genitourinary: Normal external genitalia, vagina, and cervix.  There are no palpable uterine or adnexal masses.   Musculoskeletal: No bilateral ankle edema, no clubbing or cyanosis to extremities   Psychiatric: Appropriate affect, cooperative   Neurologic: Oriented x 3, strength symmetric in all extremities, speech clear   Skin: No rashes     Result Review    Result Review:  I have personally reviewed the results from the time of this admission to 3/21/2024 09:49 EDT and agree with these findings:  [x]  Laboratory  []  Microbiology  [x]  Radiology  []  EKG/Telemetry   []  Cardiology/Vascular   [x]  Pathology  [x]  Old records  []  Other:      Assessment & Plan   Assessment / Plan     Active Hospital Problems:  Active Hospital Problems    Diagnosis     **Unwanted fertility      Plan:   We have discussed the risks, benefits, and alternatives to the procedure.  We discussed the risks including the risk of infection, bleeding and hemorrhage, injury to nearby structure including, bowel, bladder, pelvic vasculature, pelvic nerves, ovaries, and the uterus, and other nearby structures.  We have discussed the  risks of regret, risk of failure, and if failure occurred and increased risk for ectopic pregnancy.  We discussed the risk of menstrual changes, hormonal changes, and risk of pelvic pain post sterilization.  The patient has been offered alternative forms of birth control including: Oral contraceptives, NuvaRing, birth control patches, progesterone only birth control pills, Depo-Provera, all intrauterine contraceptives, partner vasectomy.  The patient expresses her understanding and wished to proceed with female permanent sterilization.  We have discussed the different methods of female sterilization including hysteroscopic and laparoscopic techniques.  With the laparoscopic techniques we have discussed occlusion of the tube with Filshie clips, Hulka clips, and Falope-Rings.  We have discussed cauterization of the fallopian tube, partial salpingectomy, and complete salpingectomy.  The patient wished to proceed with complete bilateral salpingectomy.  She understands that this is completely irreversible, and her only option for future childbearing would be in vitro fertilization once the surgery is complete.  The patient wished to proceed.    Electronically signed by Óscar Saha MD, 03/20/24, 1:57 PM EDT.

## 2024-03-21 ENCOUNTER — HOSPITAL ENCOUNTER (OUTPATIENT)
Facility: HOSPITAL | Age: 44
Setting detail: HOSPITAL OUTPATIENT SURGERY
Discharge: HOME | End: 2024-03-21
Attending: OBSTETRICS & GYNECOLOGY | Admitting: OBSTETRICS & GYNECOLOGY
Payer: COMMERCIAL

## 2024-03-21 ENCOUNTER — ANESTHESIA (OUTPATIENT)
Dept: PERIOP | Facility: HOSPITAL | Age: 44
End: 2024-03-21
Payer: COMMERCIAL

## 2024-03-21 ENCOUNTER — ANESTHESIA EVENT (OUTPATIENT)
Dept: PERIOP | Facility: HOSPITAL | Age: 44
End: 2024-03-21
Payer: COMMERCIAL

## 2024-03-21 VITALS
HEIGHT: 60 IN | RESPIRATION RATE: 18 BRPM | SYSTOLIC BLOOD PRESSURE: 95 MMHG | BODY MASS INDEX: 22.85 KG/M2 | DIASTOLIC BLOOD PRESSURE: 59 MMHG | TEMPERATURE: 98.3 F | HEART RATE: 48 BPM | WEIGHT: 116.4 LBS | OXYGEN SATURATION: 96 %

## 2024-03-21 DIAGNOSIS — Z30.09 UNWANTED FERTILITY: ICD-10-CM

## 2024-03-21 DIAGNOSIS — Z90.79 STATUS POST BILATERAL SALPINGECTOMY: Primary | ICD-10-CM

## 2024-03-21 PROBLEM — N80.9 ENDOMETRIOSIS DETERMINED BY LAPAROSCOPY: Status: ACTIVE | Noted: 2024-03-21

## 2024-03-21 LAB
ABO GROUP BLD: NORMAL
ABO GROUP BLD: NORMAL
BASOPHILS # BLD AUTO: 0.04 10*3/MM3 (ref 0–0.2)
BASOPHILS NFR BLD AUTO: 0.5 % (ref 0–1.5)
BLD GP AB SCN SERPL QL: NEGATIVE
DEPRECATED RDW RBC AUTO: 42.8 FL (ref 37–54)
EOSINOPHIL # BLD AUTO: 0.05 10*3/MM3 (ref 0–0.4)
EOSINOPHIL NFR BLD AUTO: 0.6 % (ref 0.3–6.2)
ERYTHROCYTE [DISTWIDTH] IN BLOOD BY AUTOMATED COUNT: 12.2 % (ref 12.3–15.4)
HCG INTACT+B SERPL-ACNC: <0.5 MIU/ML
HCT VFR BLD AUTO: 43.1 % (ref 34–46.6)
HGB BLD-MCNC: 13.6 G/DL (ref 12–15.9)
IMM GRANULOCYTES # BLD AUTO: 0.01 10*3/MM3 (ref 0–0.05)
IMM GRANULOCYTES NFR BLD AUTO: 0.1 % (ref 0–0.5)
LYMPHOCYTES # BLD AUTO: 1.17 10*3/MM3 (ref 0.7–3.1)
LYMPHOCYTES NFR BLD AUTO: 15.2 % (ref 19.6–45.3)
MCH RBC QN AUTO: 30.1 PG (ref 26.6–33)
MCHC RBC AUTO-ENTMCNC: 31.6 G/DL (ref 31.5–35.7)
MCV RBC AUTO: 95.4 FL (ref 79–97)
MONOCYTES # BLD AUTO: 0.46 10*3/MM3 (ref 0.1–0.9)
MONOCYTES NFR BLD AUTO: 6 % (ref 5–12)
NEUTROPHILS NFR BLD AUTO: 5.99 10*3/MM3 (ref 1.7–7)
NEUTROPHILS NFR BLD AUTO: 77.6 % (ref 42.7–76)
NRBC BLD AUTO-RTO: 0 /100 WBC (ref 0–0.2)
PLATELET # BLD AUTO: 211 10*3/MM3 (ref 140–450)
PMV BLD AUTO: 9.4 FL (ref 6–12)
RBC # BLD AUTO: 4.52 10*6/MM3 (ref 3.77–5.28)
RH BLD: POSITIVE
RH BLD: POSITIVE
T&S EXPIRATION DATE: NORMAL
WBC NRBC COR # BLD AUTO: 7.72 10*3/MM3 (ref 3.4–10.8)

## 2024-03-21 PROCEDURE — 86850 RBC ANTIBODY SCREEN: CPT | Performed by: OBSTETRICS & GYNECOLOGY

## 2024-03-21 PROCEDURE — 25010000002 BUPIVACAINE (PF) 0.25 % SOLUTION: Performed by: OBSTETRICS & GYNECOLOGY

## 2024-03-21 PROCEDURE — 58661 LAPAROSCOPY REMOVE ADNEXA: CPT

## 2024-03-21 PROCEDURE — 88302 TISSUE EXAM BY PATHOLOGIST: CPT | Performed by: OBSTETRICS & GYNECOLOGY

## 2024-03-21 PROCEDURE — 58661 LAPAROSCOPY REMOVE ADNEXA: CPT | Performed by: OBSTETRICS & GYNECOLOGY

## 2024-03-21 PROCEDURE — 25010000002 KETOROLAC TROMETHAMINE PER 15 MG

## 2024-03-21 PROCEDURE — 86901 BLOOD TYPING SEROLOGIC RH(D): CPT | Performed by: OBSTETRICS & GYNECOLOGY

## 2024-03-21 PROCEDURE — 84702 CHORIONIC GONADOTROPIN TEST: CPT | Performed by: OBSTETRICS & GYNECOLOGY

## 2024-03-21 PROCEDURE — 25010000002 SUGAMMADEX 200 MG/2ML SOLUTION

## 2024-03-21 PROCEDURE — 58662 LAPAROSCOPY EXCISE LESIONS: CPT

## 2024-03-21 PROCEDURE — 25810000003 LACTATED RINGERS PER 1000 ML: Performed by: ANESTHESIOLOGY

## 2024-03-21 PROCEDURE — 25010000002 HYDROMORPHONE 1 MG/ML SOLUTION

## 2024-03-21 PROCEDURE — 25010000002 DEXAMETHASONE PER 1 MG

## 2024-03-21 PROCEDURE — 58662 LAPAROSCOPY EXCISE LESIONS: CPT | Performed by: OBSTETRICS & GYNECOLOGY

## 2024-03-21 PROCEDURE — 85025 COMPLETE CBC W/AUTO DIFF WBC: CPT | Performed by: OBSTETRICS & GYNECOLOGY

## 2024-03-21 PROCEDURE — 25010000002 MIDAZOLAM PER 1MG: Performed by: ANESTHESIOLOGY

## 2024-03-21 PROCEDURE — 25010000002 ONDANSETRON PER 1 MG

## 2024-03-21 PROCEDURE — 25010000002 PROPOFOL 10 MG/ML EMULSION

## 2024-03-21 PROCEDURE — 86900 BLOOD TYPING SEROLOGIC ABO: CPT | Performed by: OBSTETRICS & GYNECOLOGY

## 2024-03-21 PROCEDURE — 25010000002 CEFAZOLIN PER 500 MG: Performed by: OBSTETRICS & GYNECOLOGY

## 2024-03-21 RX ORDER — DEXAMETHASONE SODIUM PHOSPHATE 4 MG/ML
INJECTION, SOLUTION INTRA-ARTICULAR; INTRALESIONAL; INTRAMUSCULAR; INTRAVENOUS; SOFT TISSUE AS NEEDED
Status: DISCONTINUED | OUTPATIENT
Start: 2024-03-21 | End: 2024-03-21 | Stop reason: SURG

## 2024-03-21 RX ORDER — KETAMINE HCL IN NACL, ISO-OSM 100MG/10ML
SYRINGE (ML) INJECTION AS NEEDED
Status: DISCONTINUED | OUTPATIENT
Start: 2024-03-21 | End: 2024-03-21 | Stop reason: SURG

## 2024-03-21 RX ORDER — PROMETHAZINE HYDROCHLORIDE 12.5 MG/1
25 TABLET ORAL ONCE AS NEEDED
Status: DISCONTINUED | OUTPATIENT
Start: 2024-03-21 | End: 2024-03-21 | Stop reason: HOSPADM

## 2024-03-21 RX ORDER — SODIUM CHLORIDE 0.9 % (FLUSH) 0.9 %
3 SYRINGE (ML) INJECTION EVERY 12 HOURS SCHEDULED
Status: DISCONTINUED | OUTPATIENT
Start: 2024-03-21 | End: 2024-03-21 | Stop reason: HOSPADM

## 2024-03-21 RX ORDER — BUPIVACAINE HYDROCHLORIDE 2.5 MG/ML
INJECTION, SOLUTION EPIDURAL; INFILTRATION; INTRACAUDAL AS NEEDED
Status: DISCONTINUED | OUTPATIENT
Start: 2024-03-21 | End: 2024-03-21 | Stop reason: HOSPADM

## 2024-03-21 RX ORDER — OXYCODONE HYDROCHLORIDE 5 MG/1
5 TABLET ORAL
Status: DISCONTINUED | OUTPATIENT
Start: 2024-03-21 | End: 2024-03-21 | Stop reason: HOSPADM

## 2024-03-21 RX ORDER — KETOROLAC TROMETHAMINE 30 MG/ML
INJECTION, SOLUTION INTRAMUSCULAR; INTRAVENOUS AS NEEDED
Status: DISCONTINUED | OUTPATIENT
Start: 2024-03-21 | End: 2024-03-21 | Stop reason: SURG

## 2024-03-21 RX ORDER — IBUPROFEN 600 MG/1
600 TABLET ORAL EVERY 6 HOURS PRN
Qty: 60 TABLET | Refills: 1 | Status: SHIPPED | OUTPATIENT
Start: 2024-03-21

## 2024-03-21 RX ORDER — MEPERIDINE HYDROCHLORIDE 25 MG/ML
12.5 INJECTION INTRAMUSCULAR; INTRAVENOUS; SUBCUTANEOUS
Status: DISCONTINUED | OUTPATIENT
Start: 2024-03-21 | End: 2024-03-21 | Stop reason: HOSPADM

## 2024-03-21 RX ORDER — PROPOFOL 10 MG/ML
VIAL (ML) INTRAVENOUS AS NEEDED
Status: DISCONTINUED | OUTPATIENT
Start: 2024-03-21 | End: 2024-03-21 | Stop reason: SURG

## 2024-03-21 RX ORDER — IBUPROFEN 600 MG/1
600 TABLET ORAL EVERY 6 HOURS PRN
Status: DISCONTINUED | OUTPATIENT
Start: 2024-03-21 | End: 2024-03-21 | Stop reason: HOSPADM

## 2024-03-21 RX ORDER — ONDANSETRON 2 MG/ML
4 INJECTION INTRAMUSCULAR; INTRAVENOUS EVERY 6 HOURS PRN
Status: DISCONTINUED | OUTPATIENT
Start: 2024-03-21 | End: 2024-03-21 | Stop reason: HOSPADM

## 2024-03-21 RX ORDER — SODIUM CHLORIDE 0.9 % (FLUSH) 0.9 %
10 SYRINGE (ML) INJECTION AS NEEDED
Status: DISCONTINUED | OUTPATIENT
Start: 2024-03-21 | End: 2024-03-21 | Stop reason: HOSPADM

## 2024-03-21 RX ORDER — HYDROCODONE BITARTRATE AND ACETAMINOPHEN 5; 325 MG/1; MG/1
1-2 TABLET ORAL EVERY 6 HOURS PRN
Qty: 14 TABLET | Refills: 0 | Status: SHIPPED | OUTPATIENT
Start: 2024-03-21

## 2024-03-21 RX ORDER — SODIUM CHLORIDE, SODIUM LACTATE, POTASSIUM CHLORIDE, CALCIUM CHLORIDE 600; 310; 30; 20 MG/100ML; MG/100ML; MG/100ML; MG/100ML
125 INJECTION, SOLUTION INTRAVENOUS CONTINUOUS
Status: DISCONTINUED | OUTPATIENT
Start: 2024-03-21 | End: 2024-03-21 | Stop reason: HOSPADM

## 2024-03-21 RX ORDER — SODIUM CHLORIDE 9 MG/ML
40 INJECTION, SOLUTION INTRAVENOUS AS NEEDED
Status: DISCONTINUED | OUTPATIENT
Start: 2024-03-21 | End: 2024-03-21 | Stop reason: HOSPADM

## 2024-03-21 RX ORDER — LIDOCAINE HYDROCHLORIDE 20 MG/ML
INJECTION, SOLUTION EPIDURAL; INFILTRATION; INTRACAUDAL; PERINEURAL AS NEEDED
Status: DISCONTINUED | OUTPATIENT
Start: 2024-03-21 | End: 2024-03-21 | Stop reason: SURG

## 2024-03-21 RX ORDER — ROCURONIUM BROMIDE 10 MG/ML
INJECTION, SOLUTION INTRAVENOUS AS NEEDED
Status: DISCONTINUED | OUTPATIENT
Start: 2024-03-21 | End: 2024-03-21 | Stop reason: SURG

## 2024-03-21 RX ORDER — BUPIVACAINE HCL/0.9 % NACL/PF 0.1 %
2000 PLASTIC BAG, INJECTION (ML) EPIDURAL ONCE
Status: COMPLETED | OUTPATIENT
Start: 2024-03-21 | End: 2024-03-21

## 2024-03-21 RX ORDER — ACETAMINOPHEN 500 MG
1000 TABLET ORAL ONCE
Status: COMPLETED | OUTPATIENT
Start: 2024-03-21 | End: 2024-03-21

## 2024-03-21 RX ORDER — ONDANSETRON 2 MG/ML
4 INJECTION INTRAMUSCULAR; INTRAVENOUS ONCE AS NEEDED
Status: DISCONTINUED | OUTPATIENT
Start: 2024-03-21 | End: 2024-03-21 | Stop reason: HOSPADM

## 2024-03-21 RX ORDER — SODIUM CHLORIDE, SODIUM LACTATE, POTASSIUM CHLORIDE, CALCIUM CHLORIDE 600; 310; 30; 20 MG/100ML; MG/100ML; MG/100ML; MG/100ML
9 INJECTION, SOLUTION INTRAVENOUS CONTINUOUS PRN
Status: DISCONTINUED | OUTPATIENT
Start: 2024-03-21 | End: 2024-03-21 | Stop reason: HOSPADM

## 2024-03-21 RX ORDER — MIDAZOLAM HYDROCHLORIDE 2 MG/2ML
2 INJECTION, SOLUTION INTRAMUSCULAR; INTRAVENOUS ONCE
Status: COMPLETED | OUTPATIENT
Start: 2024-03-21 | End: 2024-03-21

## 2024-03-21 RX ORDER — PROMETHAZINE HYDROCHLORIDE 25 MG/1
25 SUPPOSITORY RECTAL ONCE AS NEEDED
Status: DISCONTINUED | OUTPATIENT
Start: 2024-03-21 | End: 2024-03-21 | Stop reason: HOSPADM

## 2024-03-21 RX ORDER — ONDANSETRON 2 MG/ML
INJECTION INTRAMUSCULAR; INTRAVENOUS AS NEEDED
Status: DISCONTINUED | OUTPATIENT
Start: 2024-03-21 | End: 2024-03-21 | Stop reason: SURG

## 2024-03-21 RX ORDER — SCOLOPAMINE TRANSDERMAL SYSTEM 1 MG/1
1 PATCH, EXTENDED RELEASE TRANSDERMAL ONCE
Status: DISCONTINUED | OUTPATIENT
Start: 2024-03-21 | End: 2024-03-21 | Stop reason: HOSPADM

## 2024-03-21 RX ORDER — DEXMEDETOMIDINE HYDROCHLORIDE 100 UG/ML
INJECTION, SOLUTION INTRAVENOUS AS NEEDED
Status: DISCONTINUED | OUTPATIENT
Start: 2024-03-21 | End: 2024-03-21 | Stop reason: SURG

## 2024-03-21 RX ADMIN — KETOROLAC TROMETHAMINE 30 MG: 30 INJECTION, SOLUTION INTRAMUSCULAR; INTRAVENOUS at 10:14

## 2024-03-21 RX ADMIN — SODIUM CHLORIDE, POTASSIUM CHLORIDE, SODIUM LACTATE AND CALCIUM CHLORIDE 9 ML/HR: 600; 310; 30; 20 INJECTION, SOLUTION INTRAVENOUS at 08:06

## 2024-03-21 RX ADMIN — PROPOFOL 50 MG: 10 INJECTION, EMULSION INTRAVENOUS at 10:05

## 2024-03-21 RX ADMIN — MIDAZOLAM HYDROCHLORIDE 2 MG: 1 INJECTION, SOLUTION INTRAMUSCULAR; INTRAVENOUS at 09:25

## 2024-03-21 RX ADMIN — ACETAMINOPHEN 1000 MG: 500 TABLET ORAL at 08:01

## 2024-03-21 RX ADMIN — ONDANSETRON HYDROCHLORIDE 4 MG: 2 SOLUTION INTRAMUSCULAR; INTRAVENOUS at 09:59

## 2024-03-21 RX ADMIN — ROCURONIUM BROMIDE 50 MG: 10 INJECTION, SOLUTION INTRAVENOUS at 10:02

## 2024-03-21 RX ADMIN — OXYCODONE 5 MG: 5 TABLET ORAL at 11:49

## 2024-03-21 RX ADMIN — SCOPALAMINE 1 PATCH: 1 PATCH, EXTENDED RELEASE TRANSDERMAL at 08:01

## 2024-03-21 RX ADMIN — Medication 50 MG: at 10:02

## 2024-03-21 RX ADMIN — DEXMEDETOMIDINE 20 MCG: 100 INJECTION, SOLUTION INTRAVENOUS at 09:59

## 2024-03-21 RX ADMIN — PROPOFOL 200 MCG/KG/MIN: 10 INJECTION, EMULSION INTRAVENOUS at 10:02

## 2024-03-21 RX ADMIN — PROPOFOL 100 MG: 10 INJECTION, EMULSION INTRAVENOUS at 10:02

## 2024-03-21 RX ADMIN — SUGAMMADEX 200 MG: 100 INJECTION, SOLUTION INTRAVENOUS at 10:55

## 2024-03-21 RX ADMIN — LIDOCAINE HYDROCHLORIDE 50 MG: 20 INJECTION, SOLUTION INTRAVENOUS at 10:02

## 2024-03-21 RX ADMIN — HYDROMORPHONE HYDROCHLORIDE 0.5 MG: 1 INJECTION, SOLUTION INTRAMUSCULAR; INTRAVENOUS; SUBCUTANEOUS at 12:03

## 2024-03-21 RX ADMIN — DEXAMETHASONE SODIUM PHOSPHATE 8 MG: 4 INJECTION, SOLUTION INTRAMUSCULAR; INTRAVENOUS at 10:14

## 2024-03-21 RX ADMIN — Medication 2000 MG: at 10:06

## 2024-03-21 NOTE — ANESTHESIA POSTPROCEDURE EVALUATION
Patient: Eugene Boles    Procedure Summary       Date: 03/21/24 Room / Location: [facility] OR 05 / [facility] MAIN OR    Anesthesia Start: 0956 Anesthesia Stop: 1110    Procedure: SALPINGECTOMY LAPAROSCOPIC, FULGURATION OF ENDOMETRIOSIS (Bilateral: Abdomen) Diagnosis:       Unwanted fertility      Endometriosis determined by laparoscopy      (Unwanted fertility [Z30.09])    Surgeons: Óscar Saha MD Provider: Janes Holloway MD    Anesthesia Type: general ASA Status: 1            Anesthesia Type: general    Vitals  Vitals Value Taken Time   BP 94/65 03/21/24 1215   Temp 36.1 °C (97 °F) 03/21/24 1110   Pulse 56 03/21/24 1215   Resp 20 03/21/24 1205   SpO2 97 % 03/21/24 1215           Post Anesthesia Care and Evaluation    Patient location during evaluation: bedside  Patient participation: complete - patient participated  Level of consciousness: awake  Pain management: adequate    Airway patency: patent  PONV Status: none  Cardiovascular status: acceptable and stable  Respiratory status: acceptable  Hydration status: acceptable    Comments: An Anesthesiologist personally participated in the most demanding procedures (including induction and emergence if applicable) in the anesthesia plan, monitored the course of anesthesia administration at frequent intervals and remained physically present and available for immediate diagnosis and treatment of emergencies.

## 2024-03-21 NOTE — DISCHARGE INSTRUCTIONS
DISCHARGE INSTRUCTIONS  GYNECOLOGICAL  PROCEDURES      For your surgery you had:  General anesthesia (you may have a sore throat for the first 24 hours)  IV sedation  Local anesthesia  Monitored anesthesia care  You received a medicated patch for nausea prevention today (behind your ear). It is recommended that you remove it 24-48 hours post-operatively. It must be removed within 72 hours.  You received an anesthesia medication today that can cause hormonal forms of birth control to be ineffective. You should use a different form of birth control (to prevent pregnancy) for 7 days.   You may experience dizziness, drowsiness, or lightheadedness for several hours following surgery.  Do not stay alone today or tonight.  Limit your activity for 24 hours.  Resume your diet slowly.  Follow any special dietary instructions you may have been given by your doctor.  You should not drive or operate machinery, drink alcohol, or sign legally binding documents for 24 hours or while you are taking pain medication.  Last dose of pain medication was given at:  .  NOTIFY YOUR DOCTOR IF YOU EXPERIENCE ANY OF THE FOLLOWING:  Temperature greater than 101 degrees Fahrenheit  Shaking Chills  Redness or excessive drainage from incision  Nausea, vomiting and/or pain that is not controlled by prescribed medications  Increase in bleeding or bleeding that is excessive  Unable to urinate in 6 hours after surgery  If unable to reach your doctor, please go to the closest Emergency Room [] Remove dressing in:     [] You may resume intercourse and the use of tampons as your physician has instructed you.  [] Nothing in the vagina for 2 weeks to include intercourse, douches, or tampons.  [] Vaginal bleeding may be expected for several days with flow decreasing with time and never any heavier than a normal   period.  If you have an ablation, vaginal discharge is expected after bleeding stops.   If you have foul smelling discharge, notify your  physician.  Medications per physician instructions as indicated on Discharge Medication Information Sheet.  You should see   for follow-up care   on   .  Phone number:      SPECIAL INSTRUCTIONS:                              DISCHARGE INSTRUCTIONS  GYNECOLOGICAL  PROCEDURES      For your surgery you had:  General anesthesia (you may have a sore throat for the first 24 hours)  IV sedation  Local anesthesia  Monitored anesthesia care  You received a medicated patch for nausea prevention today (behind your ear). It is recommended that you remove it 24-48 hours post-operatively. It must be removed within 72 hours.  You received an anesthesia medication today that can cause hormonal forms of birth control to be ineffective. You should use a different form of birth control (to prevent pregnancy) for 7 days.   You may experience dizziness, drowsiness, or lightheadedness for several hours following surgery.  Do not stay alone today or tonight.  Limit your activity for 24 hours.  Resume your diet slowly.  Follow any special dietary instructions you may have been given by your doctor.  You should not drive or operate machinery, drink alcohol, or sign legally binding documents for 24 hours or while you are taking pain medication.  Last dose of pain medication was given at:  .  NOTIFY YOUR DOCTOR IF YOU EXPERIENCE ANY OF THE FOLLOWING:  Temperature greater than 101 degrees Fahrenheit  Shaking Chills  Redness or excessive drainage from incision  Nausea, vomiting and/or pain that is not controlled by prescribed medications  Increase in bleeding or bleeding that is excessive  Unable to urinate in 6 hours after surgery  If unable to reach your doctor, please go to the closest Emergency Room [] Remove dressing in:     [] You may resume intercourse and the use of tampons as your physician has instructed you.  [] Nothing in the vagina for 2 weeks to include intercourse, douches, or tampons.  [] Vaginal bleeding may be expected for  several days with flow decreasing with time and never any heavier than a normal   period.  If you have an ablation, vaginal discharge is expected after bleeding stops.   If you have foul smelling discharge, notify your physician.  Medications per physician instructions as indicated on Discharge Medication Information Sheet.  You should see   for follow-up care   on   .  Phone number:      SPECIAL INSTRUCTIONS:                         No driving while taking narcotic pain medications  No lifting more than 15 to 20 pounds for 1 to 2 weeks  No intercourse until follow-up  Keep the incisions clean and dry  Remove outer bandage 24 hours after surgery  Remove inner bandage/Steri-Strips 1 week from surgery  Call for temperature greater than 100 °F, shortness of breath or chest pain, heavy vaginal bleeding soaking a pad in less than 1 hour, redness swelling or drainage from the incisions, excessive nausea or vomiting, or pain that is worsening despite current pain medications

## 2024-03-21 NOTE — OP NOTE
SALPINGECTOMY LAPAROSCOPIC  Procedure Report    Patient Name:  Eugene Boles  YOB: 1980    Date of Surgery:  3/21/2024     Pre-op Diagnosis:   Unwanted fertility [Z30.09]       Post-Op Diagnosis Codes:     * Unwanted fertility [Z30.09]     * Endometriosis determined by laparoscopy [N80.9]    Procedure(s):  SALPINGECTOMY LAPAROSCOPIC, FULGURATION OF ENDOMETRIOSIS    Staff:  Surgeon(s):  Óscar Saha MD    Assistant: Dayami Marshall RN CSA    Anesthesia: General    Estimated Blood Loss: 20 mL      Specimen:          Specimens       ID Source Type Tests Collected By Collected At Frozen?    A Fallopian Tube, Left Tissue TISSUE PATHOLOGY EXAM   Óscar Saha MD 3/21/24 1031 No    B Fallopian Tube, Right Tissue TISSUE PATHOLOGY EXAM   Óscar Saha MD 3/21/24 1031 No                Findings: The uterus was normal size and shape.  Both ovaries and fallopian tubes were grossly normal.  There was endometriosis identified.  There was endometriosis implants on the right ovary, left pelvic sidewall, right pelvic sidewall.  There were no pelvic adhesions.  Was menstrual blood in the peritoneal cavity upon entry.  The fundus of the uterus was very friable, and the serosal edges slightly oozing even prior to actually beginning the salpingectomy portion of the procedure.    Complications: None    Description of Procedure: After reviewing the informed consent including the risks benefits and alternatives to the procedure, the patient expressed her understanding and desire to proceed.  She was taken to the operating room with an IV in place and running.  She was placed on the operating table in the dorsal supine position.  The patient was given a general anesthetic, and intubated endotracheally.  The patient was then repositioned into the dorsal lithotomy position.  Her arms were tucked to the side, and her legs were placed in yellow-fin stirrups.  We took care in positioning both  the arms and legs, padding any potential pressure points.  The bladder was drained in a sterile fashion with an in and out catheterization. After a surgical time-out was performed, the patient was prepped and draped in the usual sterile fashion.  I placed a sponge stick in the vagina.  I then changed my gloves, and proceeded to the abdomen.    After ensuring the patient was flat, and had an orogastric tube in place, I infiltrated in the planned incision sites with a total of 10mL of half percent Marcaine with epinephrine.  I made a horizontal infraumbilical skin incision with the scalpel.  With anterior traction on the anterior abdominal wall I inserted the Veress needle into the peritoneal cavity without difficulty.  Free-flowing saline through the Veress needle confirmed my intraperitoneal location.  Opening pressures were less than 7 mm of mercury.  The abdomen was insufflated to 25 mm of mercury. I then used a 5 mm Optiview trocar to enter the peritoneal cavity under direct visualization.      I then surveyed the area below the trocar insertion site, and there was no evidence of any bowel or vascular injury.  At this time I placed an 5 mm trocar in the left lower quadrant and right lower quadrant, under direct visualization.  The intra-abdominal pressure were reduced to 15 mm of mercury.  The patient was placed in Trendelenburg position, and the bowel was evacuated out of the pelvis with a blunt probe.    The uterus was normal size, the fallopian tubes and ovaries were normal bilaterally.  There was no pelvic adhesions, however, there were endometriosis implants on the right ovary, right and left pelvic sidewalls.  The uterine fundus was friable, and the serosal tissue using prior to starting the salpingectomy portion of the procedure.  The posterior cul-de-sac was normal.  I tented up the right fallopian tube, then using the LigaSure sealed and divided the distal end of the mesosalpinx just between the ovary  and fallopian tube.  I carried this plane of dissection proximally just underneath the fallopian tube to the cornual region of the uterus.  I then amputated the fallopian tube, and extracted through the umbilical trocar.  I carried out the same procedure on the left side, completing the salpingectomy.  At this point, I used a laparoscopic Metzenbaum scissor with energy to fulgurate the endometriosis on the right ovary right and left pelvic sidewalls, and the serosal bleeding on the uterine fundus.  The operative pressures were lowered to 5 mm mercury. I surveyed my operative tissue pedicles, and there was excellent hemostasis.      This completed the my procedure.  The right lower and left lower quadrant trochars were removed.  The seat to the umbilical trocar was removed, allowing complete evacuation of the pneumoperitoneum in patient's abdomen. The trocar cannula was then removed.  The skin sites were then reapproximated with simple, interrupted, subcuticular 4-0 Monocryl sutures.  The incisions were then reinforced with Mastisol and Steri-Strips, and covered with a coverlet.  The sponge stick was removed from the vagina.  The patient was taken out of lithotomy position, awoken from her general anesthesia, and taken to the recovery room in satisfactory condition.  All counts were correct x2, and the patient received as her preoperative antibiotics.  The patient will be discharged home which she meets PACU criteria.  The patient will follow up with me in approximately 2 weeks, and this appointment will be made for her prior to discharge.  For discharge medications please see the medication reconciliation page.  The patient was instructed to call the office for any of the following: Temperature greater than 100Â°F, shortness of breath or chest pain, pain that is worsening despite current pain medications, excessive nausea or vomiting, redness swelling or drainage from the incisions, heavy vaginal bleeding, or other  concerns.    Assistant: Dayami Marshall RN CSA  was responsible for performing the following activities: Retraction, Suction, Irrigation, Suturing, Closing, Placing Dressing, and Held/Positioned Camera and their skilled assistance was necessary for the success of this case.    Óscar Saha MD     Date: 3/21/2024  Time: 11:19 EDT

## 2024-03-21 NOTE — ANESTHESIA PREPROCEDURE EVALUATION
Anesthesia Evaluation     Patient summary reviewed and Nursing notes reviewed   no history of anesthetic complications:   NPO Solid Status: > 8 hours  NPO Liquid Status: > 2 hours           Airway   Mallampati: I  TM distance: >3 FB  Neck ROM: full  Dental - normal exam     Pulmonary - normal exam   (+) a smoker (occasional) Current,  Cardiovascular - negative cardio ROS and normal exam  Exercise tolerance: good (4-7 METS)        Neuro/Psych  (+) headaches  GI/Hepatic/Renal/Endo - negative ROS     Musculoskeletal (-) negative ROS    Abdominal  - normal exam   Substance History - negative use     OB/GYN negative ob/gyn ROS         Other - negative ROS       ROS/Med Hx Other: PAT Nursing Notes unavailable.               Anesthesia Plan    ASA 1     general     intravenous induction     Anesthetic plan, risks, benefits, and alternatives have been provided, discussed and informed consent has been obtained with: patient.    Plan discussed with CRNA.    CODE STATUS:

## 2024-03-22 LAB
CYTO UR: NORMAL
LAB AP CASE REPORT: NORMAL
LAB AP CLINICAL INFORMATION: NORMAL
PATH REPORT.FINAL DX SPEC: NORMAL
PATH REPORT.GROSS SPEC: NORMAL

## 2024-04-10 ENCOUNTER — OFFICE VISIT (OUTPATIENT)
Dept: OBSTETRICS AND GYNECOLOGY | Facility: CLINIC | Age: 44
End: 2024-04-10
Payer: COMMERCIAL

## 2024-04-10 VITALS
HEIGHT: 60 IN | SYSTOLIC BLOOD PRESSURE: 128 MMHG | WEIGHT: 118 LBS | DIASTOLIC BLOOD PRESSURE: 85 MMHG | BODY MASS INDEX: 23.16 KG/M2 | HEART RATE: 69 BPM

## 2024-04-10 DIAGNOSIS — N80.9 ENDOMETRIOSIS DETERMINED BY LAPAROSCOPY: ICD-10-CM

## 2024-04-10 DIAGNOSIS — Z90.79 STATUS POST BILATERAL SALPINGECTOMY: ICD-10-CM

## 2024-04-10 DIAGNOSIS — Z48.89 POSTOPERATIVE VISIT: Primary | ICD-10-CM

## 2024-04-10 PROBLEM — R09.81 CHRONIC NASAL CONGESTION: Status: RESOLVED | Noted: 2024-01-24 | Resolved: 2024-04-10

## 2024-04-10 PROBLEM — Z01.419 WOMEN'S ANNUAL ROUTINE GYNECOLOGICAL EXAMINATION: Status: RESOLVED | Noted: 2024-01-31 | Resolved: 2024-04-10

## 2024-04-10 PROBLEM — Z30.09 UNWANTED FERTILITY: Status: RESOLVED | Noted: 2024-01-31 | Resolved: 2024-04-10

## 2024-04-10 PROCEDURE — 99024 POSTOP FOLLOW-UP VISIT: CPT | Performed by: OBSTETRICS & GYNECOLOGY

## 2024-04-10 NOTE — ASSESSMENT & PLAN NOTE
Discussed endometriosis in detail.  Discussed options for management of endometriosis going forward.  We discussed options for both medical and surgical therapies.  Handouts were provided.  Information was also sent to the patient's MyChart.  The patient will monitor her symptoms over the next couple of months.  She will return if her dysmenorrhea is not improved.

## 2024-04-10 NOTE — PROGRESS NOTES
"[facility]  Post Operative Visit      CC: Post operative follow up    Subjective:   Pain:  No  Vaginal bleeding:  No  Vaginal discharge:  No  Fever/chills:  No  Good appetite:  Yes  Normal bladder function:  Yes  Normal bowel function:  Yes  Hot flashes: No  No problems or concerns since surgery.  The patient does report that she has had very painful menstrual cycles throughout most of her life.  She did have concerns about endometriosis and was planning to have that evaluated after this procedure.    /85   Pulse 69   Ht 152.4 cm (60\")   Wt 53.5 kg (118 lb)   LMP 03/17/2024 Comment: hcg negative  Breastfeeding No   BMI 23.05 kg/m²     Physical Exam  Vitals and nursing note reviewed.   Constitutional:       General: She is not in acute distress.     Appearance: Normal appearance. She is not ill-appearing.   HENT:      Head: Normocephalic and atraumatic.   Abdominal:      General: Abdomen is flat. There is no distension.      Palpations: Abdomen is soft. There is no mass.      Tenderness: There is no abdominal tenderness. There is no guarding or rebound.      Hernia: No hernia is present.      Comments: The incisions are clean, dry, intact and well-healing.  There are no signs of infection around the incisions.   Musculoskeletal:         General: No swelling.      Right lower leg: No edema.      Left lower leg: No edema.   Neurological:      Mental Status: She is alert and oriented to person, place, and time.   Psychiatric:         Mood and Affect: Mood normal.         Behavior: Behavior normal.         Thought Content: Thought content normal.         Judgment: Judgment normal.       Operative report, surgical findings and any pathology reviewed.    Assessment and Plan:  Diagnoses and all orders for this visit:    1. Postoperative visit (Primary)  Assessment & Plan:  Stable postoperative course  May resume normal activities  Commend OTC NSAIDs as needed for any further postoperative " pain      2. Status post bilateral salpingectomy    3. Endometriosis determined by laparoscopy  Assessment & Plan:  Discussed endometriosis in detail.  Discussed options for management of endometriosis going forward.  We discussed options for both medical and surgical therapies.  Handouts were provided.  Information was also sent to the patient's MyChart.  The patient will monitor her symptoms over the next couple of months.  She will return if her dysmenorrhea is not improved.           Any available photos and/or pathology were reviewed.  All questions answered.   Ok to resume normal activities  Ok to resume intercourse  Return to school/work without limitations    Follow Up:  Return if symptoms worsen or fail to improve.    Óscar Saha MD  04/10/2024

## 2024-04-10 NOTE — ASSESSMENT & PLAN NOTE
Stable postoperative course  May resume normal activities  Commend OTC NSAIDs as needed for any further postoperative pain

## 2024-05-01 ENCOUNTER — HOSPITAL ENCOUNTER (OUTPATIENT)
Dept: MAMMOGRAPHY | Facility: HOSPITAL | Age: 44
Discharge: HOME | End: 2024-05-01
Admitting: OBSTETRICS & GYNECOLOGY
Payer: COMMERCIAL

## 2024-05-01 DIAGNOSIS — Z01.419 WOMEN'S ANNUAL ROUTINE GYNECOLOGICAL EXAMINATION: ICD-10-CM

## 2024-05-01 PROCEDURE — 77063 BREAST TOMOSYNTHESIS BI: CPT

## 2024-05-01 PROCEDURE — 77067 SCR MAMMO BI INCL CAD: CPT

## 2024-07-03 ENCOUNTER — OFFICE VISIT (OUTPATIENT)
Dept: OTOLARYNGOLOGY | Facility: CLINIC | Age: 44
End: 2024-07-03
Payer: COMMERCIAL

## 2024-07-03 VITALS — TEMPERATURE: 97.5 F | BODY MASS INDEX: 23.6 KG/M2 | WEIGHT: 120.2 LBS | HEIGHT: 60 IN

## 2024-07-03 DIAGNOSIS — G47.30 SLEEP-DISORDERED BREATHING: ICD-10-CM

## 2024-07-03 DIAGNOSIS — R09.81 CHRONIC NASAL CONGESTION: Primary | ICD-10-CM

## 2024-07-03 DIAGNOSIS — J32.9 CHRONIC SINUSITIS, UNSPECIFIED LOCATION: ICD-10-CM

## 2024-07-03 NOTE — PATIENT INSTRUCTIONS
1.  Although there is mild deviated nasal septum I do not think there is anything significant in terms of obstruction of the nasal airway.  Patient also seems to have some sleep disordered breathing and I think probably is a good idea to do sleep study.  I suggest patient talk to family physician about getting a sleep study done.  2.  In the meantime I am going to go ahead and obtain a CT scan of sinuses for further evaluation of the nasal sinus areas little bit better.  After we reviewed the CT scan we can decide what needs to be done better.  3.  Off patient see patient for follow-up after CT  4.  If the CAT scan is pretty much normal, then we will proceed with allergy testing and treatment.

## 2024-07-03 NOTE — PROGRESS NOTES
Patient Name: Eugene Boles   Visit Date: 2024   Patient ID: [id number]  Provider: Cholo Izquierdo MD    Sex: female  [facility]   YOB: 1980  Location Address: [address] [place] [address], [address] [place] [address],?[place]?[id number]    Primary Care Provider Laz Peter APRN  Location Phone: [phone]    Referring Provider: PER Bolden        Chief Complaint  Nasal Congestion    History of Present Illness  Eugene Boles is a 43 y.o. female who presents to [facility] for Nasal Congestion.  Patient was referred by Mr. Laz Peter after patient complained of chronic nasal congestion of both nostrils having difficulty breathing through the passages for the past 5 years.  Patient reports that patient possibly have sleep disordered breathing as witnessed by her .  Patient feels tired and never get adequate quality of sleep.  Patient also reports chronic sinus infections with frequent nosebleeds as well.  Patient did have some history of blunt trauma to the nose but no history of nasal surgery or fracture.    Past Medical History:   Diagnosis Date    Abnormal Pap smear of cervix     Asthma     Endometriosis determined by laparoscopy 2024    Fetal growth restriction 2020    Herpes     Cold sores    History of  section 2024    Human papilloma virus infection 2020    Migraine     Nosebleed     Unwanted fertility        Past Surgical History:   Procedure Laterality Date    BREAST AUGMENTATION Bilateral     BREAST SURGERY  2011     SECTION N/A     COSMETIC SURGERY      SALPINGECTOMY Bilateral 2024    Procedure: SALPINGECTOMY LAPAROSCOPIC, FULGURATION OF ENDOMETRIOSIS;  Surgeon: Óscar Saha MD;  Location: [facility] MAIN OR;  Service: Gynecology;  Laterality: Bilateral;       No current outpatient medications on file.     No Known Allergies    Social History     Tobacco Use  "   Smoking status: Former     Current packs/day: 0.00     Average packs/day: 0.5 packs/day for 20.0 years (10.0 ttl pk-yrs)     Types: Cigarettes     Start date: 2000     Quit date:      Years since quittin.5     Passive exposure: Yes    Smokeless tobacco: Never   Vaping Use    Vaping status: Never Used   Substance Use Topics    Alcohol use: Not Currently    Drug use: Never        Objective     Vital Signs:   Vitals:    24 1523   Temp: 97.5 °F (36.4 °C)   TempSrc: Temporal   Weight: 54.5 kg (120 lb 3.2 oz)   Height: 152.4 cm (60\")       Tobacco Use: Medium Risk (7/3/2024)    Patient History     Smoking Tobacco Use: Former     Smokeless Tobacco Use: Never     Passive Exposure: Yes         Physical Exam    Constitutional   Appearance  well developed, well-nourished, alert and in no acute distress, voice clear and strong    Head   Inspection  no deformities or lesions      Face   Inspection  no facial lesions; House-Brackmann I/VI bilaterally   Palpation  no TMJ crepitus nor  muscle tenderness bilaterally     Eyes/Vision   Visual Fields  extraocular movements are intact, no spontaneous or gaze-induced nystagmus  Conjunctivae  clear   Sclerae  clear   Pupils and Irises  pupils equal, round, and reactive to light.   Nystagmus  not present     Ears, Nose, Mouth and Throat  Ears  External Ears  appearance within normal limits, no lesions present   Otoscopic Examination  tympanic membrane appearance within normal limits bilaterally without perforations, well-aerated middle ears   Hearing  intact to conversational voice both ears   Tunning fork testing    Rinne:  Stack:    Nose  External Nose  appearance normal   Intranasal Exam  Septum mildly deviated to the right  Modified [place] Test:    Oral Cavity  Oral Mucosa  oral mucosa normal without pallor or cyanosis   Lips  lip appearance normal   Teeth  normal dentition for age   Gums  gums pink, non-swollen, no bleeding present   Tongue  tongue " appearance normal; normal mobility   Palate  hard palate normal, soft palate appearance normal with symmetric mobility     Throat  Oropharynx  no inflammation or lesions present, tonsils within normal limits   Hypopharynx  appearance within normal limits   Larynx  voice normal     Neck  Inspection/Palpation  normal appearance, no masses or tenderness, trachea midline; thyroid size normal, nontender, no nodules or masses present on palpation     Lymphatic  Neck  no lymphadenopathy present   Supraclavicular Nodes  no lymphadenopathy present   Preauricular Nodes  no lymphadenopathy present     Respiratory  Respiratory Effort  breathing unlabored   Inspection of Chest  normal appearance, no retractions     Musculoskeletal   Cervical back: Normal range of motion and neck supple.      Skin and Subcutaneous Tissue  General Inspection  Regarding face and neck - there are no rashes present, no lesions present, and no areas of discoloration     Neurologic  Cranial Nerves  cranial nerves II-XII are grossly intact bilaterally   Gait and Station  normal gait, able to stand without diffculty    Psychiatric  Judgement and Insight  judgment and insight intact   Mood and Affect  mood normal, affect appropriate       RESULTS REVIEWED    I have reviewed the following information:   [x]  Previous Internal Note  [x]  Previous External Note:   [x]  Ordered Tests & Results:      Pathology:   Lab Results   Component Value Date    CASEREPORT  03/21/2024     Surgical Pathology Report                         Case: [id number]                                  Authorizing Provider:  Óscar Saha MD  Collected:           03/21/2024 10:31 AM          Ordering Location:     [facility] MAIN Received:            03/21/2024 01:51 PM                                 OR                                                                           Pathologist:           Elizabeth Bergeron DO                                                 "       Specimens:   1) - Fallopian Tube, Left                                                                           2) - Fallopian Tube, Right                                                                 CLININFO  03/21/2024     Unwanted fertility      FINALDX  03/21/2024     1. Left fallopian tube, salpingectomy:   - One fallopian tube with no significant pathologic change      2. Right fallopian tube, salpingectomy:   - One fallopian tube with no significant pathologic change         GROSSDES  03/21/2024     1. Fallopian Tube, Left.  The specimen is received in 1 formalin filled container labeled \" left fallopian tube\" and consists of a 5.0 cm in length by 0.7 cm in average diameter purple-gray, smooth, fimbriated fallopian tube.  Sectioning reveals an unremarkable, pinpoint lumen.  No distinct lesions are identified.  Representative sections are submitted in 1 cassette, to include the entire fimbriae.    2. Fallopian Tube, Right.  The specimen is received in 1 formalin filled container labeled \" right fallopian tube\" and consists of a 5.5 cm in length by 0.7 cm in average diameter purple-gray, smooth, fimbriated fallopian tube.  Sectioning reveals an unremarkable, pinpoint lumen.  No distinct lesions are identified.  Representative sections are submitted in 1 cassette, to include the entire fimbriae.   COLLIN      MICRO  03/21/2024     Microscopic examination performed.         TSH   Date Value Ref Range Status   01/25/2024 0.931 0.270 - 4.200 uIU/mL Final     Calcium   Date Value Ref Range Status   01/25/2024 9.4 8.6 - 10.5 mg/dL Final       Mammo Screening Digital Tomosynthesis Bilateral With CAD    Result Date: 5/1/2024  Benign mammogram. Recommend routine breast screening.  TISSUE DENSITY:  The breasts are heterogeneously dense, which may obscure small masses. The patient has received a letter regarding breast density.  BI-RADS ASSESSMENT: BI-RADS 1. Negative.   The patient's information is entered into a " computerized reminder system with a targeted due date for the next mammogram.  Note:  It has been reported that there is approximately a 15% false negative in mammography.  Therefore, management of a palpable abnormality should not be deferred because of a negative mammogram.           Electronically Signed By-SETH MORENO MD On:5/1/2024 10:03 AM        I have discussed the interpretation of the above results with the patient.    $ Nasal / Sinus Endoscopy    Date/Time: 7/3/2024 3:52 PM    Performed by: Cholo Izquierdo MD  Authorized by: Cholo Izquierdo MD    Consent:     Consent obtained:  Verbal    Consent given by:  Patient    Risks discussed:  Bleeding and pain    Alternatives discussed:  No treatment and delayed treatment  Procedure details:     Medication:  Afrin    Scope location: bilateral nare    Post-procedure details:     Patient tolerance of procedure:  Tolerated well  Comments:      After topical anesthetic and decongestant application in both nostrils, rigid nasal endoscopy was performed.  Septal deviation to the right is very mild otherwise after decongestion, excellent nasal airway is present on both sides.  I think much of the swelling is probably from the turbinates and have something to do with the allergy            Assessment and Plan   Diagnoses and all orders for this visit:    1. Chronic nasal congestion (Primary)  -     CT Sinus Without Contrast; Future    2. Chronic sinusitis, unspecified location  -     $ Nasal / Sinus Endoscopy  -     CT Sinus Without Contrast; Future    3. Sleep-disordered breathing        Eugene Boles  reports that she quit smoking about 4 years ago. Her smoking use included cigarettes. She started smoking about 24 years ago. She has a 10 pack-year smoking history. She has been exposed to tobacco smoke. She has never used smokeless tobacco.     Plan:  Patient Instructions   1.  Although there is mild deviated nasal septum I do not think there is anything  significant in terms of obstruction of the nasal airway.  Patient also seems to have some sleep disordered breathing and I think probably is a good idea to do sleep study.  I suggest patient talk to family physician about getting a sleep study done.  2.  In the meantime I am going to go ahead and obtain a CT scan of sinuses for further evaluation of the nasal sinus areas little bit better.  After we reviewed the CT scan we can decide what needs to be done better.  3.  Off patient see patient for follow-up after CT  4.  If the CAT scan is pretty much normal, then we will proceed with allergy testing and treatment.      Follow Up   Return in about 1 month (around 8/3/2024), or Follow-up after CT of sinus.  Patient was given instructions and counseling regarding her condition or for health maintenance advice. Please see specific information pulled into the AVS if appropriate.

## (undated) DEVICE — KIT,ANTI FOG,W/SPONGE & FLUID,SOFT PACK: Brand: MEDLINE

## (undated) DEVICE — 40585 XL ADVANCED TRENDELENBURG POSITIONING KIT: Brand: 40585 XL ADVANCED TRENDELENBURG POSITIONING KIT

## (undated) DEVICE — GLV SURG SENSICARE ORTHO PF LF 7 STRL

## (undated) DEVICE — ENDOPATH PNEUMONEEDLE INSUFFLATION NEEDLES WITH LUER LOCK CONNECTORS 120MM: Brand: ENDOPATH

## (undated) DEVICE — VAGINAL PREP TRAY: Brand: MEDLINE INDUSTRIES, INC.

## (undated) DEVICE — GLV SURG BIOGEL LTX PF 7 1/2

## (undated) DEVICE — SHEET,DRAPE,70X85,STERILE: Brand: MEDLINE

## (undated) DEVICE — INTENDED FOR TISSUE SEPARATION, AND OTHER PROCEDURES THAT REQUIRE A SHARP SURGICAL BLADE TO PUNCTURE OR CUT.: Brand: BARD-PARKER ® CARBON RIB-BACK BLADES

## (undated) DEVICE — SUT MNCRYL PLS ANTIB UD 4/0 PS2 18IN

## (undated) DEVICE — PAD GRND REM POLYHESIVE A/ DISP

## (undated) DEVICE — CATH URETH INTRMIT ALLPURP LTX 16F RED

## (undated) DEVICE — TBG INSUFFLATION W/CPC CONNEC: Brand: MEDLINE INDUSTRIES, INC.

## (undated) DEVICE — PCH SURG INST LAP 2 LF

## (undated) DEVICE — SYR LL TP 10ML STRL

## (undated) DEVICE — ENSEAL X1 TISSUE SEALER, CURVED JAW, 37 CM SHAFT LENGTH: Brand: ENSEAL

## (undated) DEVICE — LAPAROSCOPIC TROCAR SLEEVE/SINGLE USE: Brand: KII® OPTICAL ACCESS SYSTEM

## (undated) DEVICE — APPL CHLORAPREP HI/LITE 26ML ORNG

## (undated) DEVICE — NDL HYPO ECLPS SFTY 22G 1 1/2IN

## (undated) DEVICE — DUAL LUMEN STOMACH TUBE: Brand: SALEM SUMP

## (undated) DEVICE — TROCAR: Brand: KII® SLEEVE

## (undated) DEVICE — ADHS LIQ MASTISOL 2/3ML

## (undated) DEVICE — SLV SCD LEG COMFORT KENDALLSCD SM REPROC

## (undated) DEVICE — GLV SURG BIOGEL LTX PF 7

## (undated) DEVICE — STRIP,CLOSURE,WOUND,MEDI-STRIP,1/2X4: Brand: MEDLINE

## (undated) DEVICE — 2, DISPOSABLE SUCTION/IRRIGATOR WITHOUT DISPOSABLE TIP: Brand: STRYKEFLOW

## (undated) DEVICE — ENDOPATH XCEL BLADELESS TROCARS WITH STABILITY SLEEVES: Brand: ENDOPATH XCEL

## (undated) DEVICE — LITHOTOMY-YELLOW FINS: Brand: MEDLINE INDUSTRIES, INC.

## (undated) DEVICE — COVADERM PLUS: Brand: DEROYAL